# Patient Record
Sex: MALE | Race: WHITE | Employment: FULL TIME | URBAN - METROPOLITAN AREA
[De-identification: names, ages, dates, MRNs, and addresses within clinical notes are randomized per-mention and may not be internally consistent; named-entity substitution may affect disease eponyms.]

---

## 2017-05-23 ENCOUNTER — APPOINTMENT (OUTPATIENT)
Dept: PHYSICAL THERAPY | Age: 42
End: 2017-05-23
Payer: COMMERCIAL

## 2017-05-26 ENCOUNTER — HOSPITAL ENCOUNTER (OUTPATIENT)
Dept: PHYSICAL THERAPY | Age: 42
Discharge: HOME | End: 2017-05-26
Payer: COMMERCIAL

## 2017-05-26 PROCEDURE — 97161 PT EVAL LOW COMPLEX 20 MIN: CPT

## 2017-05-26 PROCEDURE — 97140 MANUAL THERAPY 1/> REGIONS: CPT
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
## 2017-05-26 NOTE — PROGRESS NOTES
Ambulatory/Rehab Services H2 Model Falls Risk Assessment    Risk Factor Pts. ·   Confusion/Disorientation/Impulsivity  []    4 ·   Symptomatic Depression  []   2 ·   Altered Elimination  []   1 ·   Dizziness/Vertigo  []   1 ·   Gender (Male)  [x]   1 ·   Any administered antiepileptics (anticonvulsants):  []   2 ·   Any administered benzodiazepines:  []   1 ·   Visual Impairment (specify):  []   1 ·   Portable Oxygen Use  []   1 ·   Orthostatic ? BP  []   1 ·   History of Recent Falls (within 3 mos.)  []   5     Ability to Rise from Chair (choose one) Pts. ·   Ability to rise in a single movement  [x]   0 ·   Pushes up, successful in one attempt  []   1 ·   Multiple attempts, but successful  []   3 ·   Unable to rise without assistance  []   4   Total: (5 or greater = High Risk) 1     Falls Prevention Plan:   []                Physical Limitations to Exercise (specify):   []                Mobility Assistance Device (type):   []                Exercise/Equipment Adaptation (specify):    ©2010 [facility] [place] Patent #[id number].  Federal Law prohibits the replication, distribution or use without written permission from [facility] [employer]

## 2017-05-30 ENCOUNTER — HOSPITAL ENCOUNTER (OUTPATIENT)
Dept: PHYSICAL THERAPY | Age: 42
Discharge: HOME | End: 2017-05-30
Payer: COMMERCIAL

## 2017-05-30 PROCEDURE — 97110 THERAPEUTIC EXERCISES: CPT

## 2017-05-30 PROCEDURE — 97140 MANUAL THERAPY 1/> REGIONS: CPT

## 2017-05-30 PROCEDURE — 97035 APP MDLTY 1+ULTRASOUND EA 15: CPT
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
## 2017-06-06 ENCOUNTER — HOSPITAL ENCOUNTER (OUTPATIENT)
Dept: PHYSICAL THERAPY | Age: 42
Discharge: HOME | End: 2017-06-06
Payer: COMMERCIAL

## 2017-06-06 PROCEDURE — 97140 MANUAL THERAPY 1/> REGIONS: CPT

## 2017-06-06 PROCEDURE — 97110 THERAPEUTIC EXERCISES: CPT

## 2017-06-06 PROCEDURE — 97012 MECHANICAL TRACTION THERAPY: CPT
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
## 2017-06-08 ENCOUNTER — HOSPITAL ENCOUNTER (OUTPATIENT)
Dept: PHYSICAL THERAPY | Age: 42
Discharge: HOME | End: 2017-06-08
Payer: COMMERCIAL

## 2017-06-08 PROCEDURE — 97110 THERAPEUTIC EXERCISES: CPT

## 2017-06-08 PROCEDURE — 97012 MECHANICAL TRACTION THERAPY: CPT
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
## 2017-06-13 ENCOUNTER — HOSPITAL ENCOUNTER (OUTPATIENT)
Dept: PHYSICAL THERAPY | Age: 42
Discharge: HOME | End: 2017-06-13
Payer: COMMERCIAL

## 2017-06-13 PROCEDURE — 97012 MECHANICAL TRACTION THERAPY: CPT

## 2017-06-13 PROCEDURE — 97110 THERAPEUTIC EXERCISES: CPT
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
## 2017-06-15 ENCOUNTER — HOSPITAL ENCOUNTER (OUTPATIENT)
Dept: PHYSICAL THERAPY | Age: 42
Discharge: HOME | End: 2017-06-15
Payer: COMMERCIAL

## 2017-06-15 PROCEDURE — 97110 THERAPEUTIC EXERCISES: CPT

## 2017-06-15 PROCEDURE — 97140 MANUAL THERAPY 1/> REGIONS: CPT
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
## 2017-06-20 ENCOUNTER — HOSPITAL ENCOUNTER (OUTPATIENT)
Dept: PHYSICAL THERAPY | Age: 42
Discharge: HOME | End: 2017-06-20
Payer: COMMERCIAL

## 2017-06-20 PROCEDURE — 97110 THERAPEUTIC EXERCISES: CPT

## 2017-06-20 PROCEDURE — 97012 MECHANICAL TRACTION THERAPY: CPT
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
## 2017-06-22 ENCOUNTER — HOSPITAL ENCOUNTER (OUTPATIENT)
Dept: PHYSICAL THERAPY | Age: 42
Discharge: HOME | End: 2017-06-22
Payer: COMMERCIAL

## 2017-06-22 PROCEDURE — 97110 THERAPEUTIC EXERCISES: CPT

## 2017-06-22 PROCEDURE — 97012 MECHANICAL TRACTION THERAPY: CPT
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
## 2017-06-27 ENCOUNTER — HOSPITAL ENCOUNTER (OUTPATIENT)
Dept: PHYSICAL THERAPY | Age: 42
Discharge: HOME | End: 2017-06-27
Payer: COMMERCIAL

## 2017-06-27 PROCEDURE — 97110 THERAPEUTIC EXERCISES: CPT

## 2017-06-27 PROCEDURE — 97012 MECHANICAL TRACTION THERAPY: CPT
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
## 2017-06-29 ENCOUNTER — HOSPITAL ENCOUNTER (OUTPATIENT)
Dept: PHYSICAL THERAPY | Age: 42
Discharge: HOME | End: 2017-06-29
Payer: COMMERCIAL

## 2017-06-29 PROCEDURE — 97012 MECHANICAL TRACTION THERAPY: CPT

## 2017-06-29 PROCEDURE — 97110 THERAPEUTIC EXERCISES: CPT
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
## 2017-07-11 ENCOUNTER — HOSPITAL ENCOUNTER (OUTPATIENT)
Dept: PHYSICAL THERAPY | Age: 42
Discharge: HOME | End: 2017-07-11
Payer: COMMERCIAL

## 2017-07-11 PROCEDURE — 97012 MECHANICAL TRACTION THERAPY: CPT

## 2017-07-11 PROCEDURE — 97110 THERAPEUTIC EXERCISES: CPT
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
## 2017-07-13 ENCOUNTER — APPOINTMENT (OUTPATIENT)
Dept: PHYSICAL THERAPY | Age: 42
End: 2017-07-13
Payer: COMMERCIAL
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
## 2022-03-03 ENCOUNTER — HOSPITAL ENCOUNTER (OUTPATIENT)
Age: 47
Discharge: NURSING, REHAB OR LONG-TERM CARE | End: 2022-03-18
Attending: INTERNAL MEDICINE | Admitting: INTERNAL MEDICINE

## 2022-03-03 ENCOUNTER — APPOINTMENT (OUTPATIENT)
Dept: GENERAL RADIOLOGY | Age: 47
End: 2022-03-03
Attending: INTERNAL MEDICINE

## 2022-03-03 PROCEDURE — 74018 RADEX ABDOMEN 1 VIEW: CPT

## 2022-03-03 PROCEDURE — 71045 X-RAY EXAM CHEST 1 VIEW: CPT

## 2022-03-04 LAB
ALBUMIN SERPL-MCNC: 2.5 G/DL (ref 3.5–5)
ALBUMIN/GLOB SERPL: 0.6 {RATIO} (ref 1.2–3.5)
ALP SERPL-CCNC: 103 U/L (ref 50–136)
ALT SERPL-CCNC: 18 U/L (ref 12–65)
ANION GAP SERPL CALC-SCNC: 12 MMOL/L (ref 7–16)
AST SERPL-CCNC: 22 U/L (ref 15–37)
BASOPHILS # BLD: 0 K/UL (ref 0–0.2)
BASOPHILS NFR BLD: 0 % (ref 0–2)
BILIRUB SERPL-MCNC: 0.5 MG/DL (ref 0.2–1.1)
BUN SERPL-MCNC: 22 MG/DL (ref 6–23)
CALCIUM SERPL-MCNC: 9.1 MG/DL (ref 8.3–10.4)
CHLORIDE SERPL-SCNC: 99 MMOL/L (ref 98–107)
CO2 SERPL-SCNC: 25 MMOL/L (ref 21–32)
CREAT SERPL-MCNC: 1 MG/DL (ref 0.8–1.5)
DIFFERENTIAL METHOD BLD: ABNORMAL
EOSINOPHIL # BLD: 0.8 K/UL (ref 0–0.8)
EOSINOPHIL NFR BLD: 7 % (ref 0.5–7.8)
ERYTHROCYTE [DISTWIDTH] IN BLOOD BY AUTOMATED COUNT: 15.3 % (ref 11.9–14.6)
GLOBULIN SER CALC-MCNC: 3.9 G/DL (ref 2.3–3.5)
GLUCOSE SERPL-MCNC: 67 MG/DL (ref 65–100)
HCT VFR BLD AUTO: 24.7 % (ref 41.1–50.3)
HGB BLD-MCNC: 7.7 G/DL (ref 13.6–17.2)
IMM GRANULOCYTES # BLD AUTO: 0.1 K/UL (ref 0–0.5)
IMM GRANULOCYTES NFR BLD AUTO: 1 % (ref 0–5)
LYMPHOCYTES # BLD: 1.3 K/UL (ref 0.5–4.6)
LYMPHOCYTES NFR BLD: 11 % (ref 13–44)
MAGNESIUM SERPL-MCNC: 2.1 MG/DL (ref 1.8–2.4)
MCH RBC QN AUTO: 28.6 PG (ref 26.1–32.9)
MCHC RBC AUTO-ENTMCNC: 31.2 G/DL (ref 31.4–35)
MCV RBC AUTO: 91.8 FL (ref 79.6–97.8)
MONOCYTES # BLD: 0.8 K/UL (ref 0.1–1.3)
MONOCYTES NFR BLD: 7 % (ref 4–12)
NEUTS SEG # BLD: 8.9 K/UL (ref 1.7–8.2)
NEUTS SEG NFR BLD: 75 % (ref 43–78)
NRBC # BLD: 0 K/UL (ref 0–0.2)
PHOSPHATE SERPL-MCNC: 3.9 MG/DL (ref 2.5–4.5)
PLATELET # BLD AUTO: 271 K/UL (ref 150–450)
PMV BLD AUTO: 10.3 FL (ref 9.4–12.3)
POTASSIUM SERPL-SCNC: 4.1 MMOL/L (ref 3.5–5.1)
PROT SERPL-MCNC: 6.4 G/DL (ref 6.3–8.2)
RBC # BLD AUTO: 2.69 M/UL (ref 4.23–5.6)
SODIUM SERPL-SCNC: 136 MMOL/L (ref 136–145)
T4 FREE SERPL-MCNC: 0.9 NG/DL (ref 0.9–1.8)
TSH SERPL DL<=0.005 MIU/L-ACNC: 1.48 UIU/ML (ref 0.36–3.74)
WBC # BLD AUTO: 11.9 K/UL (ref 4.3–11.1)

## 2022-03-04 PROCEDURE — 36415 COLL VENOUS BLD VENIPUNCTURE: CPT

## 2022-03-04 PROCEDURE — 85025 COMPLETE CBC W/AUTO DIFF WBC: CPT

## 2022-03-04 PROCEDURE — 80053 COMPREHEN METABOLIC PANEL: CPT

## 2022-03-04 PROCEDURE — 84443 ASSAY THYROID STIM HORMONE: CPT

## 2022-03-04 PROCEDURE — 84439 ASSAY OF FREE THYROXINE: CPT

## 2022-03-04 PROCEDURE — 84100 ASSAY OF PHOSPHORUS: CPT

## 2022-03-04 PROCEDURE — 83735 ASSAY OF MAGNESIUM: CPT

## 2022-03-07 LAB
ALBUMIN SERPL-MCNC: 2.5 G/DL (ref 3.5–5)
ALBUMIN/GLOB SERPL: 0.5 {RATIO} (ref 1.2–3.5)
ALP SERPL-CCNC: 78 U/L (ref 50–136)
ALT SERPL-CCNC: 28 U/L (ref 12–65)
ANION GAP SERPL CALC-SCNC: 3 MMOL/L (ref 7–16)
AST SERPL-CCNC: 16 U/L (ref 15–37)
BASOPHILS # BLD: 0.1 K/UL (ref 0–0.2)
BASOPHILS NFR BLD: 1 % (ref 0–2)
BILIRUB SERPL-MCNC: 0.3 MG/DL (ref 0.2–1.1)
BUN SERPL-MCNC: 23 MG/DL (ref 6–23)
CALCIUM SERPL-MCNC: 9.7 MG/DL (ref 8.3–10.4)
CHLORIDE SERPL-SCNC: 98 MMOL/L (ref 98–107)
CO2 SERPL-SCNC: 36 MMOL/L (ref 21–32)
CREAT SERPL-MCNC: 0.8 MG/DL (ref 0.8–1.5)
DIFFERENTIAL METHOD BLD: ABNORMAL
EOSINOPHIL # BLD: 0.9 K/UL (ref 0–0.8)
EOSINOPHIL NFR BLD: 9 % (ref 0.5–7.8)
ERYTHROCYTE [DISTWIDTH] IN BLOOD BY AUTOMATED COUNT: 14.6 % (ref 11.9–14.6)
GLOBULIN SER CALC-MCNC: 5.3 G/DL (ref 2.3–3.5)
GLUCOSE SERPL-MCNC: 114 MG/DL (ref 65–100)
HCT VFR BLD AUTO: 27.6 % (ref 41.1–50.3)
HGB BLD-MCNC: 8.5 G/DL (ref 13.6–17.2)
IMM GRANULOCYTES # BLD AUTO: 0.1 K/UL (ref 0–0.5)
IMM GRANULOCYTES NFR BLD AUTO: 1 % (ref 0–5)
LYMPHOCYTES # BLD: 1.1 K/UL (ref 0.5–4.6)
LYMPHOCYTES NFR BLD: 11 % (ref 13–44)
MCH RBC QN AUTO: 28.1 PG (ref 26.1–32.9)
MCHC RBC AUTO-ENTMCNC: 30.8 G/DL (ref 31.4–35)
MCV RBC AUTO: 91.4 FL (ref 79.6–97.8)
MONOCYTES # BLD: 0.7 K/UL (ref 0.1–1.3)
MONOCYTES NFR BLD: 7 % (ref 4–12)
NEUTS SEG # BLD: 7.2 K/UL (ref 1.7–8.2)
NEUTS SEG NFR BLD: 72 % (ref 43–78)
NRBC # BLD: 0 K/UL (ref 0–0.2)
PLATELET # BLD AUTO: 273 K/UL (ref 150–450)
PMV BLD AUTO: 10.8 FL (ref 9.4–12.3)
POTASSIUM SERPL-SCNC: 3.6 MMOL/L (ref 3.5–5.1)
PROT SERPL-MCNC: 7.8 G/DL (ref 6.3–8.2)
RBC # BLD AUTO: 3.02 M/UL (ref 4.23–5.6)
SODIUM SERPL-SCNC: 137 MMOL/L (ref 138–145)
WBC # BLD AUTO: 9.9 K/UL (ref 4.3–11.1)

## 2022-03-07 PROCEDURE — 36415 COLL VENOUS BLD VENIPUNCTURE: CPT

## 2022-03-07 PROCEDURE — 80053 COMPREHEN METABOLIC PANEL: CPT

## 2022-03-07 PROCEDURE — 85025 COMPLETE CBC W/AUTO DIFF WBC: CPT

## 2022-03-09 ENCOUNTER — APPOINTMENT (OUTPATIENT)
Dept: ULTRASOUND IMAGING | Age: 47
End: 2022-03-09
Attending: INTERNAL MEDICINE

## 2022-03-09 PROCEDURE — 93970 EXTREMITY STUDY: CPT

## 2022-03-10 ENCOUNTER — APPOINTMENT (OUTPATIENT)
Dept: GENERAL RADIOLOGY | Age: 47
End: 2022-03-10
Attending: INTERNAL MEDICINE

## 2022-03-10 PROCEDURE — 71045 X-RAY EXAM CHEST 1 VIEW: CPT

## 2022-03-12 ENCOUNTER — APPOINTMENT (OUTPATIENT)
Dept: GENERAL RADIOLOGY | Age: 47
End: 2022-03-12
Attending: INTERNAL MEDICINE

## 2022-03-12 PROCEDURE — 70360 X-RAY EXAM OF NECK: CPT

## 2022-03-14 LAB
ALBUMIN SERPL-MCNC: 2.9 G/DL (ref 3.5–5)
ALBUMIN/GLOB SERPL: 0.6 {RATIO} (ref 1.2–3.5)
ALP SERPL-CCNC: 70 U/L (ref 50–136)
ALT SERPL-CCNC: 26 U/L (ref 12–65)
ANION GAP SERPL CALC-SCNC: 1 MMOL/L (ref 7–16)
AST SERPL-CCNC: 12 U/L (ref 15–37)
BASOPHILS # BLD: 0.1 K/UL (ref 0–0.2)
BASOPHILS NFR BLD: 1 % (ref 0–2)
BILIRUB SERPL-MCNC: 0.3 MG/DL (ref 0.2–1.1)
BUN SERPL-MCNC: 23 MG/DL (ref 6–23)
CALCIUM SERPL-MCNC: 10.3 MG/DL (ref 8.3–10.4)
CHLORIDE SERPL-SCNC: 94 MMOL/L (ref 98–107)
CO2 SERPL-SCNC: 39 MMOL/L (ref 21–32)
CREAT SERPL-MCNC: 1.2 MG/DL (ref 0.8–1.5)
DIFFERENTIAL METHOD BLD: ABNORMAL
EOSINOPHIL # BLD: 0.7 K/UL (ref 0–0.8)
EOSINOPHIL NFR BLD: 10 % (ref 0.5–7.8)
ERYTHROCYTE [DISTWIDTH] IN BLOOD BY AUTOMATED COUNT: 14.6 % (ref 11.9–14.6)
GLOBULIN SER CALC-MCNC: 5 G/DL (ref 2.3–3.5)
GLUCOSE SERPL-MCNC: 91 MG/DL (ref 65–100)
HCT VFR BLD AUTO: 29.6 % (ref 41.1–50.3)
HGB BLD-MCNC: 9 G/DL (ref 13.6–17.2)
IMM GRANULOCYTES # BLD AUTO: 0.1 K/UL (ref 0–0.5)
IMM GRANULOCYTES NFR BLD AUTO: 1 % (ref 0–5)
LYMPHOCYTES # BLD: 1.4 K/UL (ref 0.5–4.6)
LYMPHOCYTES NFR BLD: 21 % (ref 13–44)
MCH RBC QN AUTO: 27.5 PG (ref 26.1–32.9)
MCHC RBC AUTO-ENTMCNC: 30.4 G/DL (ref 31.4–35)
MCV RBC AUTO: 90.5 FL (ref 79.6–97.8)
MONOCYTES # BLD: 0.5 K/UL (ref 0.1–1.3)
MONOCYTES NFR BLD: 7 % (ref 4–12)
NEUTS SEG # BLD: 4.1 K/UL (ref 1.7–8.2)
NEUTS SEG NFR BLD: 60 % (ref 43–78)
NRBC # BLD: 0 K/UL (ref 0–0.2)
PLATELET # BLD AUTO: 277 K/UL (ref 150–450)
PMV BLD AUTO: 9.7 FL (ref 9.4–12.3)
POTASSIUM SERPL-SCNC: 4.4 MMOL/L (ref 3.5–5.1)
PROT SERPL-MCNC: 7.9 G/DL (ref 6.3–8.2)
RBC # BLD AUTO: 3.27 M/UL (ref 4.23–5.6)
SODIUM SERPL-SCNC: 134 MMOL/L (ref 138–145)
WBC # BLD AUTO: 6.9 K/UL (ref 4.3–11.1)

## 2022-03-14 PROCEDURE — 80053 COMPREHEN METABOLIC PANEL: CPT

## 2022-03-14 PROCEDURE — 36415 COLL VENOUS BLD VENIPUNCTURE: CPT

## 2022-03-14 PROCEDURE — 85025 COMPLETE CBC W/AUTO DIFF WBC: CPT

## 2022-08-05 ENCOUNTER — OFFICE VISIT (OUTPATIENT)
Dept: PULMONOLOGY | Age: 47
End: 2022-08-05
Payer: COMMERCIAL

## 2022-08-05 ENCOUNTER — HOSPITAL ENCOUNTER (OUTPATIENT)
Dept: GENERAL RADIOLOGY | Age: 47
Discharge: HOME | End: 2022-08-08
Payer: COMMERCIAL

## 2022-08-05 VITALS
OXYGEN SATURATION: 100 % | DIASTOLIC BLOOD PRESSURE: 80 MMHG | RESPIRATION RATE: 20 BRPM | HEIGHT: 73 IN | TEMPERATURE: 98 F | HEART RATE: 103 BPM | BODY MASS INDEX: 23.59 KG/M2 | WEIGHT: 178 LBS | SYSTOLIC BLOOD PRESSURE: 110 MMHG

## 2022-08-05 DIAGNOSIS — J84.10 PULMONARY FIBROSIS (HCC): ICD-10-CM

## 2022-08-05 DIAGNOSIS — J96.11 CHRONIC RESPIRATORY FAILURE WITH HYPOXIA (HCC): Primary | ICD-10-CM

## 2022-08-05 DIAGNOSIS — U09.9 POST-COVID-19 CONDITION: ICD-10-CM

## 2022-08-05 PROCEDURE — 71046 X-RAY EXAM CHEST 2 VIEWS: CPT

## 2022-08-05 PROCEDURE — 99204 OFFICE O/P NEW MOD 45 MIN: CPT | Performed by: INTERNAL MEDICINE

## 2022-08-05 RX ORDER — TRAZODONE HYDROCHLORIDE 100 MG/1
100 TABLET ORAL NIGHTLY PRN
COMMUNITY
Start: 2022-07-25

## 2022-08-05 RX ORDER — CLONAZEPAM 1 MG/1
1 TABLET ORAL 3 TIMES DAILY
COMMUNITY
Start: 2022-07-25 | End: 2022-10-19

## 2022-08-05 RX ORDER — GABAPENTIN 100 MG/1
CAPSULE ORAL
COMMUNITY
Start: 2022-06-07

## 2022-08-05 NOTE — PROGRESS NOTES
getting some better. He is doing in home PT. Has a referral for pulmonary rehab already. He states that his O2 needs are often 1-1.5L at rest and 4L with exertion. In the evening he can go without it for hours at rest. Lowest with this is typically 91-92%. He is using a cane when he walks outside of the home. No history of past pulmonary problems. REVIEW OF SYSTEMS:  10 point review of systems is negative except as reported in HPI. PHYSICAL EXAM:  There were no vitals filed for this visit. PERTINENT FINDINGS:   NAD  CTA B, no w/r/r  RRR, no m/r/g  No LE Edema. DIAGNOSTIC TESTS:                                                                                                             LABS: No results for input(s): HGB, HCT, TSH, NTPROBNP in the last 72 hours. Imaging:  CXR: No results found for this or any previous visit from the past 365 days. CT WITHOUT CONTRAST: No results found for this or any previous visit from the past 365 days. CT WITH CONTRAST: No results found for this or any previous visit from the past 365 days. CT HIGH RES: No results found for this or any previous visit from the past 365 days. CT PE PROTOCOL: No results found for this or any previous visit from the past 365 days. LDCT SCREENING: No results found for this or any previous visit from the past 365 days. PET SCAN: No results found for this or any previous visit from the past 365 days. PFTs:   No flowsheet data found. Exercise Oximetry: No results found for this or any previous visit. Echo: No results found for this or any previous visit. [facility] Reference Info:                                                                                                                  No past medical history on file.    Tobacco Use: High Risk    Smoking Tobacco Use: Some Days    Smokeless Tobacco Use: Unknown     Not on File  Current Outpatient Medications   Medication Instructions HYDROcodone-acetaminophen (NORCO) 5-325 MG per tablet 1 tablet, Oral, EVERY 6 HOURS PRN

## 2022-08-11 ENCOUNTER — TELEPHONE (OUTPATIENT)
Dept: PULMONOLOGY | Age: 47
End: 2022-08-11

## 2022-08-11 DIAGNOSIS — J96.11 CHRONIC RESPIRATORY FAILURE WITH HYPOXIA (HCC): Primary | ICD-10-CM

## 2022-08-11 DIAGNOSIS — J84.10 PULMONARY FIBROSIS (HCC): ICD-10-CM

## 2022-08-11 DIAGNOSIS — U09.9 POST-COVID-19 CONDITION: ICD-10-CM

## 2022-08-12 NOTE — TELEPHONE ENCOUNTER
Patient is being d/c today from [address] [place] [address] Montrell Radford PT- she states he needs us to refer him to Outpt Pulmonary Rehab beginning next week.
Referral entered and faxed.
no indicators present

## 2022-08-19 ENCOUNTER — TELEPHONE (OUTPATIENT)
Dept: PULMONOLOGY | Age: 47
End: 2022-08-19

## 2022-08-19 DIAGNOSIS — J96.11 CHRONIC RESPIRATORY FAILURE WITH HYPOXIA (HCC): ICD-10-CM

## 2022-08-19 DIAGNOSIS — J84.10 PULMONARY FIBROSIS (HCC): ICD-10-CM

## 2022-08-19 DIAGNOSIS — U09.9 POST-COVID-19 CONDITION: Primary | ICD-10-CM

## 2022-08-19 NOTE — TELEPHONE ENCOUNTER
----- Message from Maggie Uribe MD sent at 8/14/2022 12:59 PM EDT -----  Can you let the pt know that his CXR showed some ongoing covid related changes. I would like to get a non contrast CT chest to see if there are any inflammatory changes that additional immunomodulator medications may improve. Thanks.    Maggie Uribe MD

## 2022-08-19 NOTE — TELEPHONE ENCOUNTER
Spoke with the patient in regards to their CXR results, explained per Dr. Gleason  that the CXR showed some ongoing Covid related changes and that he would like to get a CT scan to see if there are any inflammatory changes that additional immunomodulator mediations may improve. I also explained that his ART showed that 1 liter of oxygen qhs was enough for him and that he can continue using this setting. Patient understood the results and did not have any further questions or concerns at this time. Order for the CT scan has been established.  // Lisa JOHN

## 2022-08-26 ENCOUNTER — HOSPITAL ENCOUNTER (OUTPATIENT)
Dept: CT IMAGING | Age: 47
Discharge: HOME | End: 2022-08-29
Payer: COMMERCIAL

## 2022-08-26 DIAGNOSIS — J96.11 CHRONIC RESPIRATORY FAILURE WITH HYPOXIA (HCC): ICD-10-CM

## 2022-08-26 DIAGNOSIS — U09.9 POST-COVID-19 CONDITION: ICD-10-CM

## 2022-08-26 DIAGNOSIS — J84.10 PULMONARY FIBROSIS (HCC): ICD-10-CM

## 2022-08-26 PROCEDURE — 71250 CT THORAX DX C-: CPT

## 2022-08-29 ENCOUNTER — TELEPHONE (OUTPATIENT)
Dept: PULMONOLOGY | Age: 47
End: 2022-08-29

## 2022-08-29 DIAGNOSIS — U09.9 POST-COVID-19 CONDITION: ICD-10-CM

## 2022-08-29 DIAGNOSIS — J84.10 PULMONARY FIBROSIS (HCC): Primary | ICD-10-CM

## 2022-08-29 RX ORDER — PREDNISONE 20 MG/1
60 TABLET ORAL DAILY
Qty: 90 TABLET | Refills: 2 | Status: SHIPPED | OUTPATIENT
Start: 2022-08-29 | End: 2022-09-30 | Stop reason: SDUPTHER

## 2022-08-29 NOTE — TELEPHONE ENCOUNTER
----- Message from Josue Horta MD sent at 8/29/2022 11:56 AM EDT -----  Can you let the patient know that his CT scan showed what looks like ongoing lung inflammation. I think it would be worth while to start him on steroids to see if we get any improvement in his lung function. I will order him a rx for prednisone 60mg daily and would like for him to continue it for 1 month and then repeat a CT chest without contrast if we could set that up. We will then determine how quickly to wean the steroids. Thanks.    Josue Horta MD

## 2022-08-29 NOTE — TELEPHONE ENCOUNTER
Spoke with the patient in regards to their CT scan results, explained per Dr. Katherine Coronel that the CT scan showed what looks like ongoing lung inflammation and that Dr. Katherine Coronel feels like it would be worth while to start him on steroids to see if we get any improvement in his lung function. I also notified the patient that Dr. Katherine Coronel has already sent a prescription for Prednisone 60 mg daily to his pharmacy and that we will repeat the CT scan in 1 month to determine how quickly we are to wean the steroids. Patient understood the results and did not have any further questions or concerns at this time. Order for CT scan has been established.  // Jeancarlos Fret. A.

## 2022-08-31 ENCOUNTER — HOSPITAL ENCOUNTER (OUTPATIENT)
Dept: CARDIAC REHAB | Age: 47
Setting detail: RECURRING SERIES
Discharge: HOME | End: 2022-09-03

## 2022-08-31 ASSESSMENT — PATIENT HEALTH QUESTIONNAIRE - PHQ9
5. POOR APPETITE OR OVEREATING: 0
SUM OF ALL RESPONSES TO PHQ9 QUESTIONS 1 & 2: 0
6. FEELING BAD ABOUT YOURSELF - OR THAT YOU ARE A FAILURE OR HAVE LET YOURSELF OR YOUR FAMILY DOWN: 0
SUM OF ALL RESPONSES TO PHQ QUESTIONS 1-9: 0
2. FEELING DOWN, DEPRESSED OR HOPELESS: 0
3. TROUBLE FALLING OR STAYING ASLEEP: 0
1. LITTLE INTEREST OR PLEASURE IN DOING THINGS: 0
10. IF YOU CHECKED OFF ANY PROBLEMS, HOW DIFFICULT HAVE THESE PROBLEMS MADE IT FOR YOU TO DO YOUR WORK, TAKE CARE OF THINGS AT HOME, OR GET ALONG WITH OTHER PEOPLE: 0
8. MOVING OR SPEAKING SO SLOWLY THAT OTHER PEOPLE COULD HAVE NOTICED. OR THE OPPOSITE, BEING SO FIGETY OR RESTLESS THAT YOU HAVE BEEN MOVING AROUND A LOT MORE THAN USUAL: 0
9. THOUGHTS THAT YOU WOULD BE BETTER OFF DEAD, OR OF HURTING YOURSELF: 0
SUM OF ALL RESPONSES TO PHQ QUESTIONS 1-9: 0
4. FEELING TIRED OR HAVING LITTLE ENERGY: 0
SUM OF ALL RESPONSES TO PHQ QUESTIONS 1-9: 0
SUM OF ALL RESPONSES TO PHQ QUESTIONS 1-9: 0
7. TROUBLE CONCENTRATING ON THINGS, SUCH AS READING THE NEWSPAPER OR WATCHING TELEVISION: 0

## 2022-08-31 ASSESSMENT — LIFESTYLE VARIABLES: SMOKELESS_TOBACCO: NO

## 2022-08-31 NOTE — CARDIO/PULMONARY
8/31/2022      Dear Dr. Jose Nieves: Thank you for referring your patient, Thomas Chowdhury (EFY:[id number] ), to the Pulmonary Rehabilitation Program at [address] [facility]. Mr. Destini Hull is a good candidate for the program and should see improvements with regular participation. We will be working to increase your patient's endurance and self care over the next 12 weeks. We will contact you with any issues or concerns that may arise, or you can follow your patient's progress through [address] [place] [address] at any time. We will send you a final summary report when the program is completed. Again, thank you for your referral. If we can be of further assistance, please feel free to contact the Cardiopulmonary Rehab staff at [phone].     Sincerely,      Madeleine Frankel, RRT,   Pulmonary Rehab Specialist   [facility]    CC: File

## 2022-09-06 ENCOUNTER — HOSPITAL ENCOUNTER (OUTPATIENT)
Dept: CARDIAC REHAB | Age: 47
Setting detail: RECURRING SERIES
Discharge: HOME | End: 2022-09-09
Payer: COMMERCIAL

## 2022-09-06 PROCEDURE — G0239 OTH RESP PROC, GROUP: HCPCS

## 2022-09-06 ASSESSMENT — EXERCISE STRESS TEST
PEAK_METS: 2.1
PEAK_BP: 146/70
PEAK_HR: 108
PEAK_BP: 146/70
PEAK_BP: 146/70
PEAK_HR: 108

## 2022-09-06 ASSESSMENT — LIFESTYLE VARIABLES: SMOKELESS_TOBACCO: NO

## 2022-09-07 VITALS — WEIGHT: 174.2 LBS | OXYGEN SATURATION: 96 % | BODY MASS INDEX: 23.09 KG/M2 | HEIGHT: 73 IN

## 2022-09-08 ENCOUNTER — HOSPITAL ENCOUNTER (OUTPATIENT)
Dept: CARDIAC REHAB | Age: 47
Setting detail: RECURRING SERIES
Discharge: HOME | End: 2022-09-11
Payer: COMMERCIAL

## 2022-09-08 PROCEDURE — 94625 PHY/QHP OP PULM RHB W/O MNTR: CPT

## 2022-09-08 ASSESSMENT — EXERCISE STRESS TEST
PEAK_HR: 98
PEAK_BP: 112/76
PEAK_METS: 2.2

## 2022-09-13 ENCOUNTER — HOSPITAL ENCOUNTER (OUTPATIENT)
Dept: CARDIAC REHAB | Age: 47
Setting detail: RECURRING SERIES
Discharge: HOME | End: 2022-09-16
Payer: COMMERCIAL

## 2022-09-13 VITALS — BODY MASS INDEX: 23.67 KG/M2 | WEIGHT: 179.4 LBS

## 2022-09-13 PROCEDURE — 94625 PHY/QHP OP PULM RHB W/O MNTR: CPT

## 2022-09-13 ASSESSMENT — EXERCISE STRESS TEST
PEAK_METS: 2.7
PEAK_BP: 118/70
PEAK_HR: 91

## 2022-09-14 ENCOUNTER — NURSE ONLY (OUTPATIENT)
Dept: PULMONOLOGY | Age: 47
End: 2022-09-14
Payer: COMMERCIAL

## 2022-09-14 DIAGNOSIS — U09.9 POST-COVID-19 CONDITION: ICD-10-CM

## 2022-09-14 DIAGNOSIS — R06.09 DOE (DYSPNEA ON EXERTION): Primary | ICD-10-CM

## 2022-09-14 LAB
FEV 1 , POC: 1.66 L
FEV1 % PRED, POC: 33 %
FEV1/FVC, POC: NORMAL
FVC % PRED, POC: 37 %
FVC, POC: NORMAL
TOTAL HEMOGLOBIN (SPHB), POC: 15.9 G/DL

## 2022-09-14 PROCEDURE — 88738 HGB QUANT TRANSCUTANEOUS: CPT | Performed by: INTERNAL MEDICINE

## 2022-09-14 PROCEDURE — 94729 DIFFUSING CAPACITY: CPT | Performed by: INTERNAL MEDICINE

## 2022-09-14 PROCEDURE — 94726 PLETHYSMOGRAPHY LUNG VOLUMES: CPT | Performed by: INTERNAL MEDICINE

## 2022-09-14 PROCEDURE — 94060 EVALUATION OF WHEEZING: CPT | Performed by: INTERNAL MEDICINE

## 2022-09-14 ASSESSMENT — PULMONARY FUNCTION TESTS
FVC_PERCENT_PREDICTED_POC: 37
FEV1_PERCENT_PREDICTED_POC: 33

## 2022-09-15 ENCOUNTER — HOSPITAL ENCOUNTER (OUTPATIENT)
Dept: CARDIAC REHAB | Age: 47
Setting detail: RECURRING SERIES
End: 2022-09-15
Payer: COMMERCIAL

## 2022-09-19 ENCOUNTER — HOSPITAL ENCOUNTER (OUTPATIENT)
Dept: CARDIAC REHAB | Age: 47
Setting detail: RECURRING SERIES
Discharge: HOME | End: 2022-09-22
Payer: COMMERCIAL

## 2022-09-19 VITALS — BODY MASS INDEX: 24.12 KG/M2 | WEIGHT: 182.8 LBS

## 2022-09-19 PROCEDURE — 94625 PHY/QHP OP PULM RHB W/O MNTR: CPT

## 2022-09-19 ASSESSMENT — EXERCISE STRESS TEST
PEAK_HR: 98
PEAK_METS: 2.7
PEAK_BP: 110/76

## 2022-09-20 ENCOUNTER — APPOINTMENT (OUTPATIENT)
Dept: CARDIAC REHAB | Age: 47
End: 2022-09-20
Payer: COMMERCIAL

## 2022-09-20 ENCOUNTER — CLINICAL DOCUMENTATION (OUTPATIENT)
Dept: PULMONOLOGY | Age: 47
End: 2022-09-20

## 2022-09-20 ENCOUNTER — NURSE ONLY (OUTPATIENT)
Dept: PULMONOLOGY | Age: 47
End: 2022-09-20
Payer: COMMERCIAL

## 2022-09-20 DIAGNOSIS — R06.09 DOE (DYSPNEA ON EXERTION): Primary | ICD-10-CM

## 2022-09-20 PROCEDURE — 94618 PULMONARY STRESS TESTING: CPT | Performed by: INTERNAL MEDICINE

## 2022-09-20 NOTE — PROGRESS NOTES
[address] , [facility], [address]  [phone]    6 Minute Walk Test      Patient's Name Diego Beach   Age 50 y.o. Gender male   Height 73 inches   Weight 182 lbs   Ordering Provider  Dr. Camilo Villavicencio     Medications taken before test are up to date:  Yes   Supplemental oxygen during the test:  Yes, @ 5 LPM Pulse     Base End    Time 1510 1516 Pm   Blood Pressure 128/79 132/74 mmHg   Heart Rate  75 90 bpm   Dyspnea  1 4 Maurice Scale   Fatigue 0 3 Maurice Scale   O2 Saturation  95% 84% O2       Stopped or paused before 6 minutes? Yes   Other symptoms at end of exercise: None     Number of laps: ___5__ x 60 meters = ___300___ meters + final partial lap:__45____ meters =  ___345____ meters    Total distance walked in 6 minutes: 345  Meters  Predicted distance: _____703______ meters  Percent of predicted distance: __49__ %                Tech comments: Patient started the walk with 5L of oxygen. After walking 4.5 minutes patient had to stop for 20 seconds and increased oxygen to 6L to finish test.   After test patient's SpO2 was 84% and denilson to 94% after resting for 2 minutes.     Test Performed by: Allyssa Graves RRT

## 2022-09-21 ENCOUNTER — HOSPITAL ENCOUNTER (OUTPATIENT)
Dept: CARDIAC REHAB | Age: 47
Setting detail: RECURRING SERIES
Discharge: HOME | End: 2022-09-24
Payer: COMMERCIAL

## 2022-09-21 PROCEDURE — 93798 PHYS/QHP OP CAR RHAB W/ECG: CPT

## 2022-09-21 PROCEDURE — 94625 PHY/QHP OP PULM RHB W/O MNTR: CPT

## 2022-09-21 ASSESSMENT — EXERCISE STRESS TEST
PEAK_HR: 110
PEAK_METS: 3.1
PEAK_BP: 120/72

## 2022-09-22 ENCOUNTER — APPOINTMENT (OUTPATIENT)
Dept: CARDIAC REHAB | Age: 47
End: 2022-09-22
Payer: COMMERCIAL

## 2022-09-26 ENCOUNTER — HOSPITAL ENCOUNTER (OUTPATIENT)
Dept: CARDIAC REHAB | Age: 47
Setting detail: RECURRING SERIES
End: 2022-09-26
Payer: COMMERCIAL

## 2022-09-26 ENCOUNTER — HOSPITAL ENCOUNTER (OUTPATIENT)
Dept: CARDIAC REHAB | Age: 47
Setting detail: RECURRING SERIES
Discharge: HOME | End: 2022-09-29
Payer: COMMERCIAL

## 2022-09-26 VITALS — WEIGHT: 184 LBS | BODY MASS INDEX: 24.28 KG/M2

## 2022-09-26 PROCEDURE — 94625 PHY/QHP OP PULM RHB W/O MNTR: CPT

## 2022-09-26 ASSESSMENT — EXERCISE STRESS TEST
PEAK_HR: 123
PEAK_METS: 3.1
PEAK_BP: 108/70

## 2022-09-27 ENCOUNTER — APPOINTMENT (OUTPATIENT)
Dept: CARDIAC REHAB | Age: 47
End: 2022-09-27
Payer: COMMERCIAL

## 2022-09-28 ENCOUNTER — TELEPHONE (OUTPATIENT)
Dept: PULMONOLOGY | Age: 47
End: 2022-09-28

## 2022-09-28 ENCOUNTER — HOSPITAL ENCOUNTER (OUTPATIENT)
Dept: CARDIAC REHAB | Age: 47
Setting detail: RECURRING SERIES
Discharge: HOME | End: 2022-10-01
Payer: COMMERCIAL

## 2022-09-28 PROCEDURE — 94625 PHY/QHP OP PULM RHB W/O MNTR: CPT

## 2022-09-28 ASSESSMENT — EXERCISE STRESS TEST
PEAK_BP: 108/80
PEAK_METS: 3.1
PEAK_HR: 121

## 2022-09-28 NOTE — TELEPHONE ENCOUNTER
----- Message from Milton Gonzalez MD sent at 9/27/2022 11:57 AM EDT -----  Can you let the patient know that he desatured to 84% even on 5L O2. He likely needs at least 6L. Can we see if we can use this data to get him a 10L concentrator for home use. Thanks.      Milton Gonzalez MD

## 2022-09-28 NOTE — TELEPHONE ENCOUNTER
Order has been sent via [employer] to [employer] to increase oxygen to 6 lpm cont and will need a 10 liter concentrator.   Patient will also be notified via Mychart. // Carmine Melgar

## 2022-09-29 ENCOUNTER — APPOINTMENT (OUTPATIENT)
Dept: CARDIAC REHAB | Age: 47
End: 2022-09-29
Payer: COMMERCIAL

## 2022-09-30 ENCOUNTER — HOSPITAL ENCOUNTER (OUTPATIENT)
Dept: CT IMAGING | Age: 47
Discharge: HOME | End: 2022-10-03
Payer: COMMERCIAL

## 2022-09-30 DIAGNOSIS — J84.10 PULMONARY FIBROSIS (HCC): ICD-10-CM

## 2022-09-30 DIAGNOSIS — U09.9 POST-COVID-19 CONDITION: ICD-10-CM

## 2022-09-30 PROCEDURE — 71250 CT THORAX DX C-: CPT

## 2022-09-30 NOTE — TELEPHONE ENCOUNTER
Patient Refill Request     Last Office Visit: 08/05/2022 with Dr. Aman Junior     When they were supposed to follow up: 3 months     Upcoming Office Visit: 11/16/2022 with Dr. Johnny Vicente Requested: Prednisone 20 mg     Type of refill: 90 day     Requested Pharmacy: [facility] [address]     Is prescription pended?  Yes

## 2022-10-03 ENCOUNTER — PATIENT MESSAGE (OUTPATIENT)
Dept: PULMONOLOGY | Age: 47
End: 2022-10-03

## 2022-10-03 ENCOUNTER — HOSPITAL ENCOUNTER (OUTPATIENT)
Dept: CARDIAC REHAB | Age: 47
Setting detail: RECURRING SERIES
Discharge: HOME | End: 2022-10-06
Payer: COMMERCIAL

## 2022-10-03 VITALS — WEIGHT: 185 LBS | BODY MASS INDEX: 24.41 KG/M2

## 2022-10-03 PROCEDURE — 94625 PHY/QHP OP PULM RHB W/O MNTR: CPT

## 2022-10-03 RX ORDER — PREDNISONE 20 MG/1
60 TABLET ORAL DAILY
Qty: 90 TABLET | Refills: 2 | Status: SHIPPED | OUTPATIENT
Start: 2022-10-03

## 2022-10-03 ASSESSMENT — EXERCISE STRESS TEST
PEAK_BP: 126/60
PEAK_METS: 3.1
PEAK_HR: 130

## 2022-10-03 NOTE — TELEPHONE ENCOUNTER
Spoke with Dr. Cecilia Nielsen and he stated that he would like for the patient to come in sooner so that way he can explain the disability paperwork and his condition further in detail. Patient was agreeable with an appointment on 10/21/22 @ 9:40 am.  Patient understood and did not have any further questions or concerns at this time.  // Carmenza Pedersen

## 2022-10-03 NOTE — PROGRESS NOTES
73-year-old male  s/p COVID enrolled in pulmonary rehabilitation, seen today for initial nutrition counseling. Patient stated goals: trying to gain weight/muscle    NUTRITION ASSESSMENT   Anthropometrics:  Ht: 6' 1\"  Wt: 185#  BMI: 24.41  BMI class of Normal based on age below 72  Waist measurement: 36.5      Medical History: COVID 1/22 3 month hospitalization/rehab    Nutrition related medications/supplements: prednisone     Nutrition Related Labs:   reviewed    Nutrition/Diet History:   Patient has lost significant amount of weight when hospitalized, started at 235# and was discharged at 160#. He has started to gain healthy weight back. He lost his taste for about 4 months and says he now craves 'good' healthy foods and doesn't eat much junk food at all anymore. Diet Recall:  Breakfast: 2 cups coffee, bran flakes, glasss of OJ, pop tart  Lunch: salad with yogurt   Dinner: chicken biscuit with peas and rice   Beverages: 2-3 L water daily, 2 cups coffee, 1 glass OJ   Oral Nutritional Supplements: emergen-C daily     NFPE: no muscle or fat loss observed. Lifestyle, Culture, Support System: Pt is on disability, lives alone. Physical Activity: Ambulates independently; participates in 45-60 min of supervised rehabilitation 2x per week. Supplemental O2 use    Stage of Behavior Change: Action    Barriers: none identified    Estimated Nutritional Needs:  Calories: MSJ x 1.3 (increased metabolic demand): 5061 calories   Protein: 1.2-1.5g/kg BW for protein/muscle synthesis: 100-126g    NUTRITION DIAGNOSIS  No nutrition diagnosis at this time. NUTRITION INTERVENTION  Nutrition Education and Counseling:   Reviewed optimal nutrition to improve breathing and improve immune function. Discussed importance of adequate protein to support lean mass and healthy weight gain.  May add premier protein shake   Encouraged including 5 servings of fruits/vegetables daily to increase fiber to 30g/day   Reviewed body comp results/goals, and nutrition modifications that will support improvements in body composition. Guidelines for sodium (<2300mg/day or follow MD recommendations)      Handouts provided for home use:   [employer] Eating Plate    Nutrition Goals: Increase protein intake to improve strength, stamina, and breathing by the end of pulmonary rehabilitation    NUTRITION MONITORING/EVALUATION  RD to follow up with participant during rehab sessions for questions and assessment of progression toward goals. Anticipate Great compliance with recommendations. Pt verbalizes understanding to recommendations discussed.      Virgie Hodgkin, [address] [place] [address], SHRUTI  Cardiopulmonary Rehab Dietitian

## 2022-10-03 NOTE — TELEPHONE ENCOUNTER
From: HAILE Ortiz  To: Osiris Laura  Sent: 10/3/2022 1:31 PM EDT  Subject: Gary Bill Augusto Juliette Montero is your ADA Paperwork as per discussed. If you have any further questions or concerns please do not hesitate to give our office a call.     Sincerely,  Francesca MASTERSON

## 2022-10-04 ENCOUNTER — APPOINTMENT (OUTPATIENT)
Dept: CARDIAC REHAB | Age: 47
End: 2022-10-04
Payer: COMMERCIAL

## 2022-10-05 ENCOUNTER — HOSPITAL ENCOUNTER (OUTPATIENT)
Dept: CARDIAC REHAB | Age: 47
Setting detail: RECURRING SERIES
End: 2022-10-05
Payer: COMMERCIAL

## 2022-10-06 ENCOUNTER — APPOINTMENT (OUTPATIENT)
Dept: CARDIAC REHAB | Age: 47
End: 2022-10-06
Payer: COMMERCIAL

## 2022-10-06 ASSESSMENT — EXERCISE STRESS TEST: PEAK_BP: 126/60

## 2022-10-06 ASSESSMENT — LIFESTYLE VARIABLES: SMOKELESS_TOBACCO: NO

## 2022-10-06 NOTE — RESULT ENCOUNTER NOTE
Repeat CT scan not changed with course of steroids. Patient has follow up appointment in a couple weeks and depending on symptoms we will likely lay out a relatively quick steroid taper plan.    Anna Morrell MD

## 2022-10-10 ENCOUNTER — HOSPITAL ENCOUNTER (OUTPATIENT)
Dept: CARDIAC REHAB | Age: 47
Setting detail: RECURRING SERIES
Discharge: HOME | End: 2022-10-13
Payer: COMMERCIAL

## 2022-10-10 VITALS — BODY MASS INDEX: 24.41 KG/M2 | WEIGHT: 185 LBS

## 2022-10-10 PROCEDURE — 94625 PHY/QHP OP PULM RHB W/O MNTR: CPT

## 2022-10-10 ASSESSMENT — EXERCISE STRESS TEST
PEAK_HR: 123
PEAK_METS: 2.4
PEAK_BP: 120/70

## 2022-10-11 ENCOUNTER — APPOINTMENT (OUTPATIENT)
Dept: CARDIAC REHAB | Age: 47
End: 2022-10-11
Payer: COMMERCIAL

## 2022-10-12 ENCOUNTER — HOSPITAL ENCOUNTER (OUTPATIENT)
Dept: CARDIAC REHAB | Age: 47
Setting detail: RECURRING SERIES
End: 2022-10-12
Payer: COMMERCIAL

## 2022-10-13 ENCOUNTER — APPOINTMENT (OUTPATIENT)
Dept: CARDIAC REHAB | Age: 47
End: 2022-10-13
Payer: COMMERCIAL

## 2022-10-17 ENCOUNTER — HOSPITAL ENCOUNTER (OUTPATIENT)
Dept: CARDIAC REHAB | Age: 47
Setting detail: RECURRING SERIES
Discharge: HOME | End: 2022-10-20
Payer: COMMERCIAL

## 2022-10-17 VITALS — WEIGHT: 187 LBS | BODY MASS INDEX: 24.67 KG/M2

## 2022-10-17 PROCEDURE — 94625 PHY/QHP OP PULM RHB W/O MNTR: CPT

## 2022-10-17 ASSESSMENT — EXERCISE STRESS TEST
PEAK_METS: 3
PEAK_HR: 120
PEAK_BP: 126/72

## 2022-10-18 ENCOUNTER — APPOINTMENT (OUTPATIENT)
Dept: CARDIAC REHAB | Age: 47
End: 2022-10-18
Payer: COMMERCIAL

## 2022-10-19 ENCOUNTER — HOSPITAL ENCOUNTER (OUTPATIENT)
Dept: CARDIAC REHAB | Age: 47
Setting detail: RECURRING SERIES
Discharge: HOME | End: 2022-10-22
Payer: COMMERCIAL

## 2022-10-19 ENCOUNTER — INITIAL CONSULT (OUTPATIENT)
Dept: CARDIOLOGY CLINIC | Age: 47
End: 2022-10-19
Payer: COMMERCIAL

## 2022-10-19 VITALS
HEIGHT: 73 IN | HEART RATE: 101 BPM | WEIGHT: 188 LBS | SYSTOLIC BLOOD PRESSURE: 120 MMHG | DIASTOLIC BLOOD PRESSURE: 86 MMHG | BODY MASS INDEX: 24.92 KG/M2

## 2022-10-19 DIAGNOSIS — Z76.89 ENCOUNTER TO ESTABLISH CARE: Primary | ICD-10-CM

## 2022-10-19 DIAGNOSIS — I48.0 PAROXYSMAL ATRIAL FIBRILLATION (HCC): ICD-10-CM

## 2022-10-19 DIAGNOSIS — U09.9 COVID-19 LONG HAULER: ICD-10-CM

## 2022-10-19 DIAGNOSIS — R06.02 SHORTNESS OF BREATH: ICD-10-CM

## 2022-10-19 DIAGNOSIS — J96.11 CHRONIC RESPIRATORY FAILURE WITH HYPOXIA (HCC): ICD-10-CM

## 2022-10-19 PROCEDURE — 99245 OFF/OP CONSLTJ NEW/EST HI 55: CPT | Performed by: INTERNAL MEDICINE

## 2022-10-19 PROCEDURE — 93000 ELECTROCARDIOGRAM COMPLETE: CPT | Performed by: INTERNAL MEDICINE

## 2022-10-19 PROCEDURE — 94625 PHY/QHP OP PULM RHB W/O MNTR: CPT

## 2022-10-19 RX ORDER — BUSPIRONE HYDROCHLORIDE 5 MG/1
5 TABLET ORAL 2 TIMES DAILY
COMMUNITY

## 2022-10-19 RX ORDER — ASPIRIN 81 MG/1
81 TABLET ORAL DAILY
COMMUNITY

## 2022-10-19 ASSESSMENT — EXERCISE STRESS TEST
PEAK_METS: 3
PEAK_HR: 132
PEAK_BP: 126/70

## 2022-10-19 NOTE — PROGRESS NOTES
[address], [facility], [address] [place] [address]  PHONE: [phone]        10/19/22    NAME:  Mike Roque  : 1975  MRN: [id number]       SUBJECTIVE:   Mike Roque is a 50 y.o. male seen for a consultation visit regarding the following:     Chief Complaint   Patient presents with    Consultation          HPI:  Consultation is requested by Bridger Leija DO for evaluation of Consultation  Was working for [employer], became ill in [address].  Had prolonged illness with COVID, trach, stay at [facility]. Former tobacco smoker, quit in 2022. Had renal failure needing CRRT, pAfib. Prolonged 2 mo stay. 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 HUMPHREY ongoing now, doing PT and exercise. NO CP, pressure. On oxygen now. No edema. No edema. Patient denies recent history of orthopnea, PND, excessive dizziness and/or syncope. Some anxiety issues as well. Father with PPM, PCI, seen by EP. Carly 2022 Echo:  · The left ventricular systolic function is normal (55-65%). · Unable to assess left ventricular diastolic function. · The right ventricle is dilated. · The right ventricular systolic function is normal.   · Mild tricuspid valve regurgitation. Past Medical History, Past Surgical History, Family history, Social History, and Medications were all reviewed with the patient today and updated as necessary.        Current Outpatient Medications   Medication Sig Dispense Refill    sertraline (ZOLOFT) 50 MG tablet Take 50 mg by mouth daily      aspirin 81 MG EC tablet Take 81 mg by mouth daily      busPIRone (BUSPAR) 5 MG tablet Take 5 mg by mouth 2 times daily      predniSONE (DELTASONE) 20 MG tablet Take 3 tablets by mouth daily (Patient taking differently: Take 20 mg by mouth daily) 90 tablet 2    fluticasone-salmeterol (ADVAIR HFA) 115-21 MCG/ACT inhaler Inhale 2 puffs into the lungs 2 times daily Rinse mouth after use      gabapentin (NEURONTIN) 100 MG capsule TAKE 1 CAPSULE BY MOUTH THREE TIMES A DAY      metoprolol tartrate (LOPRESSOR) 25 MG tablet Take 25 mg by mouth 2 times daily      traZODone (DESYREL) 100 MG tablet Take 100 mg by mouth nightly as needed      clonazePAM (KLONOPIN) 1 MG tablet Take 1 mg by mouth in the morning and 1 mg at noon and 1 mg before bedtime. HYDROcodone-acetaminophen (NORCO) 5-325 MG per tablet Take 1 tablet by mouth every 6 hours as needed. No current facility-administered medications for this visit. No Known Allergies  Patient Active Problem List    Diagnosis Date Noted    COVID-19 long hauler 10/19/2022     Priority: Medium    Chronic respiratory failure with hypoxia ([facility] 10/19/2022     Priority: Medium    Paroxysmal atrial fibrillation ([facility] 10/19/2022     Priority: Medium    Shortness of breath 10/19/2022     Priority: Medium      Past Surgical History:   Procedure Laterality Date    HERNIA REPAIR       Family History   Problem Relation Age of Onset    Other Mother         Arthritis    Cancer Paternal Grandfather     Hypertension Mother      Social History     Tobacco Use    Smoking status: Former     Packs/day: 0.50     Years: 20.00     Pack years: 10.00     Types: Cigarettes     Quit date:      Years since quittin.7    Smokeless tobacco: Never   Substance Use Topics    Alcohol use: Yes       ROS:    Constitutional:   Negative for fevers and unexplained weight loss. Eyes:   Negative for visual disturbance. ENT:   Negative for significant hearing loss and tinnitus. Respiratory:   Negative for hemoptysis. Cardiovascular:   Negative except as noted in HPI. Gastrointestinal:   Negative for melena and abdominal pain. Genitourinary:   Negative for hematuria, renal stones. Integumentary:   Negative for rash or non-healing wounds  Hematologic/Lymphatic:   Negative for excessive bleeding hx or clotting disorder. Musculoskeletal:  Negative for active, unexplained/severe joint pain.   Neurological:   Negative for stroke. Behavioral/Psych:   Negative for suicidal ideations. Endocrine:   Negative for uncontrolled diabetic symptoms including polyuria, polydipsia and poor wound healing. PHYSICAL EXAM:     /86   Pulse (!) 101   Ht 6' 1\" (1.854 m)   Wt 188 lb (85.3 kg)   BMI 24.80 kg/m²    General/Constitutional:   Alert and oriented x 3, no acute distress  HEENT:   normocephalic, atraumatic, moist mucous membranes  Neck:   No JVD or carotid bruits bilaterally  Cardiovascular:   regular rate and rhythm, no murmur/rub/gallop appreciated  Pulmonary:   clear to auscultation bilaterally, no respiratory distress  Abdomen:   soft, non-tender, non-distended  Ext:   No sig LE edema bilaterally  Skin:  warm and dry, no obvious rashes seen  Neuro:   no obvious sensory or motor deficits  Psychiatric:   normal mood and affect      EKG Today and independently reviewed by me: sinus rhythm, normal intervals and non-specific ST/T wave changes. Medical problems, medical history and test results were reviewed with the patient today.        Lab Results   Component Value Date/Time     03/14/2022 06:53 AM    K 4.4 03/14/2022 06:53 AM    CL 94 03/14/2022 06:53 AM    CO2 39 03/14/2022 06:53 AM    BUN 23 03/14/2022 06:53 AM    CREATININE 1.20 03/14/2022 06:53 AM    GLUCOSE 91 03/14/2022 06:53 AM    CALCIUM 10.3 03/14/2022 06:53 AM        Lab Results   Component Value Date    WBC 6.9 03/14/2022    HGB 9.0 (L) 03/14/2022    HCT 29.6 (L) 03/14/2022    MCV 90.5 03/14/2022     03/14/2022       Lab Results   Component Value Date    TSH 1.480 03/04/2022       No results found for: LABA1C  No results found for: EAG      No results found for: CHOL  No results found for: TRIG  No results found for: HDL  No results found for: LDLCHOLESTEROL, LDLCALC  No results found for: LABVLDL, VLDL  No results found for: CHOLHDLRATIO        I have Independently reviewed prior care notes, any ER records available, cardiac testing, labs and results with the patient and before seeing the patient today. Also independently reviewed outside records when available. ASSESSMENT:    Dilip Wynne was seen today for consultation. Diagnoses and all orders for this visit:    Encounter to establish care  -     EKG 12 Lead    COVID-19 long hauler    Chronic respiratory failure with hypoxia (HCC)    Paroxysmal atrial fibrillation (HCC)  -     Transthoracic echocardiogram (TTE) complete with contrast, bubble, strain, and 3D PRN; Future  -     Nuclear stress test with myocardial perfusion; Future    Shortness of breath  -     Transthoracic echocardiogram (TTE) complete with contrast, bubble, strain, and 3D PRN; Future  -     Nuclear stress test with myocardial perfusion; Future        PLAN:     Long COVID:  check echo, check RVSP. Follow, on oxygen. HUMPHREY/SOB/Resp Failure  prior smoker, Fam hx of CAD in father. Check echo and NST. Given these risk factors and symptoms as outlined, plan for NST. We did review options of NST, LHC, other stress testing and agreed to proceed with NST in our office. To ER for worsening angina. Plan on definitive LHC for worsening angina. HTN:  remain on BB. Follow, check echo. PAF:  sinus, remain on BB and ASA. Monitor for more symptoms. Follow for now. Anxiety mgmt is key as well as reviewed. Long extensive review with patient and father today about hospital course. The patient has been instructed to call with any angina or equivalent as reviewed today. All questions were answered with the patient voicing complete understanding. Patient has been instructed and agrees to call our office with any issues or other concerns related to their cardiac condition(s) and/or complaint(s).         Return for Return After Test.       ETHAN HAND DO  10/19/2022

## 2022-10-20 ENCOUNTER — APPOINTMENT (OUTPATIENT)
Dept: CARDIAC REHAB | Age: 47
End: 2022-10-20
Payer: COMMERCIAL

## 2022-10-24 ENCOUNTER — HOSPITAL ENCOUNTER (OUTPATIENT)
Dept: CARDIAC REHAB | Age: 47
Setting detail: RECURRING SERIES
Discharge: HOME | End: 2022-10-27
Payer: COMMERCIAL

## 2022-10-24 VITALS — WEIGHT: 189.8 LBS | BODY MASS INDEX: 25.04 KG/M2

## 2022-10-24 PROCEDURE — 94625 PHY/QHP OP PULM RHB W/O MNTR: CPT

## 2022-10-24 ASSESSMENT — EXERCISE STRESS TEST
PEAK_METS: 3
PEAK_BP: 108/78
PEAK_HR: 114

## 2022-10-25 ENCOUNTER — APPOINTMENT (OUTPATIENT)
Dept: CARDIAC REHAB | Age: 47
End: 2022-10-25
Payer: COMMERCIAL

## 2022-10-25 ENCOUNTER — TELEPHONE (OUTPATIENT)
Dept: PULMONOLOGY | Age: 47
End: 2022-10-25

## 2022-10-25 NOTE — TELEPHONE ENCOUNTER
LE: on or about 10/17, I attempted to call patient regarding appt. N/A. I left  message for patient to call me back to discuss f/u on 10/21 with Dr. Steve Beasley.

## 2022-10-26 ENCOUNTER — HOSPITAL ENCOUNTER (OUTPATIENT)
Dept: CARDIAC REHAB | Age: 47
Setting detail: RECURRING SERIES
Discharge: HOME | End: 2022-10-29
Payer: COMMERCIAL

## 2022-10-26 PROCEDURE — 94625 PHY/QHP OP PULM RHB W/O MNTR: CPT

## 2022-10-26 ASSESSMENT — EXERCISE STRESS TEST
PEAK_HR: 92
PEAK_METS: 3
PEAK_BP: 100/62

## 2022-10-27 ENCOUNTER — APPOINTMENT (OUTPATIENT)
Dept: CARDIAC REHAB | Age: 47
End: 2022-10-27
Payer: COMMERCIAL

## 2022-10-31 ENCOUNTER — HOSPITAL ENCOUNTER (OUTPATIENT)
Dept: CARDIAC REHAB | Age: 47
Setting detail: RECURRING SERIES
Discharge: HOME | End: 2022-11-03
Payer: COMMERCIAL

## 2022-10-31 VITALS — BODY MASS INDEX: 25.28 KG/M2 | WEIGHT: 191.6 LBS

## 2022-10-31 PROCEDURE — 94625 PHY/QHP OP PULM RHB W/O MNTR: CPT

## 2022-10-31 ASSESSMENT — EXERCISE STRESS TEST
PEAK_METS: 3
PEAK_HR: 105
PEAK_BP: 130/80

## 2022-11-01 ENCOUNTER — APPOINTMENT (OUTPATIENT)
Dept: CARDIAC REHAB | Age: 47
End: 2022-11-01
Payer: COMMERCIAL

## 2022-11-01 ENCOUNTER — TELEPHONE (OUTPATIENT)
Dept: CARDIOLOGY CLINIC | Age: 47
End: 2022-11-01

## 2022-11-01 NOTE — TELEPHONE ENCOUNTER
----- Message from Fely Berry DO sent at 10/28/2022  1:31 PM EDT -----  Please call the patient. NST was ok, nothing major or concerning. If having more angina (worsening HUMPHREY, CP, SOB), please let us know. Will review more at their follow up appointment and get plan for the future.     Thanks,   Prosper Dowling

## 2022-11-02 ENCOUNTER — HOSPITAL ENCOUNTER (OUTPATIENT)
Dept: CARDIAC REHAB | Age: 47
Setting detail: RECURRING SERIES
End: 2022-11-02
Payer: COMMERCIAL

## 2022-11-03 ENCOUNTER — HOSPITAL ENCOUNTER (OUTPATIENT)
Dept: CARDIAC REHAB | Age: 47
Setting detail: RECURRING SERIES
Discharge: HOME | End: 2022-11-06
Payer: COMMERCIAL

## 2022-11-03 ENCOUNTER — APPOINTMENT (OUTPATIENT)
Dept: CARDIAC REHAB | Age: 47
End: 2022-11-03
Payer: COMMERCIAL

## 2022-11-03 PROCEDURE — 94625 PHY/QHP OP PULM RHB W/O MNTR: CPT

## 2022-11-03 ASSESSMENT — EXERCISE STRESS TEST
PEAK_HR: 93
PEAK_BP: 102/58
PEAK_METS: 2.5
PEAK_BP: 130/80

## 2022-11-03 ASSESSMENT — LIFESTYLE VARIABLES: SMOKELESS_TOBACCO: NO

## 2022-11-04 ENCOUNTER — TELEPHONE (OUTPATIENT)
Dept: CARDIOLOGY CLINIC | Age: 47
End: 2022-11-04

## 2022-11-04 NOTE — TELEPHONE ENCOUNTER
----- Message from Brendan Rodriguez DO sent at 11/4/2022 10:19 AM EDT -----  Please call the patient. ECHO was ok, nothing major or concerning. If having more angina (worsening HUMPHREY, CP, SOB), please let us know. Will review more at follow up appointment and get plan for the future.     Thanks,   Ivonne Neighbours

## 2022-11-07 ENCOUNTER — HOSPITAL ENCOUNTER (OUTPATIENT)
Dept: CARDIAC REHAB | Age: 47
Setting detail: RECURRING SERIES
Discharge: HOME | End: 2022-11-10
Payer: COMMERCIAL

## 2022-11-07 VITALS — BODY MASS INDEX: 25.6 KG/M2 | WEIGHT: 194 LBS

## 2022-11-07 PROCEDURE — 94625 PHY/QHP OP PULM RHB W/O MNTR: CPT

## 2022-11-07 ASSESSMENT — EXERCISE STRESS TEST
PEAK_METS: 2.7
PEAK_HR: 106
PEAK_BP: 118/80

## 2022-11-08 ENCOUNTER — APPOINTMENT (OUTPATIENT)
Dept: CARDIAC REHAB | Age: 47
End: 2022-11-08
Payer: COMMERCIAL

## 2022-11-09 ENCOUNTER — HOSPITAL ENCOUNTER (OUTPATIENT)
Dept: CARDIAC REHAB | Age: 47
Setting detail: RECURRING SERIES
Discharge: HOME | End: 2022-11-12
Payer: COMMERCIAL

## 2022-11-09 PROCEDURE — 94625 PHY/QHP OP PULM RHB W/O MNTR: CPT

## 2022-11-09 ASSESSMENT — EXERCISE STRESS TEST
PEAK_BP: 102/66
PEAK_METS: 2.8
PEAK_HR: 111

## 2022-11-10 ENCOUNTER — APPOINTMENT (OUTPATIENT)
Dept: CARDIAC REHAB | Age: 47
End: 2022-11-10
Payer: COMMERCIAL

## 2022-11-14 ENCOUNTER — HOSPITAL ENCOUNTER (OUTPATIENT)
Dept: CARDIAC REHAB | Age: 47
Setting detail: RECURRING SERIES
Discharge: HOME | End: 2022-11-17
Payer: COMMERCIAL

## 2022-11-14 VITALS — BODY MASS INDEX: 26.02 KG/M2 | WEIGHT: 197.2 LBS

## 2022-11-14 PROCEDURE — 94625 PHY/QHP OP PULM RHB W/O MNTR: CPT

## 2022-11-14 ASSESSMENT — EXERCISE STRESS TEST
PEAK_BP: 120/70
PEAK_HR: 112
PEAK_METS: 2.8

## 2022-11-15 ENCOUNTER — APPOINTMENT (OUTPATIENT)
Dept: CARDIAC REHAB | Age: 47
End: 2022-11-15
Payer: COMMERCIAL

## 2022-11-16 ENCOUNTER — HOSPITAL ENCOUNTER (OUTPATIENT)
Dept: CARDIAC REHAB | Age: 47
Setting detail: RECURRING SERIES
Discharge: HOME | End: 2022-11-19
Payer: COMMERCIAL

## 2022-11-16 PROCEDURE — G0239 OTH RESP PROC, GROUP: HCPCS

## 2022-11-16 ASSESSMENT — EXERCISE STRESS TEST
PEAK_METS: 2.8
PEAK_BP: 112/60
PEAK_HR: 110

## 2022-11-17 ENCOUNTER — APPOINTMENT (OUTPATIENT)
Dept: CARDIAC REHAB | Age: 47
End: 2022-11-17
Payer: COMMERCIAL

## 2022-11-18 ENCOUNTER — TELEPHONE (OUTPATIENT)
Dept: CARDIOLOGY CLINIC | Age: 47
End: 2022-11-18

## 2022-11-18 NOTE — TELEPHONE ENCOUNTER
----- Message from [facility], DO sent at 11/18/2022  3:10 PM EST -----  See me in 3-4mos for follow up appt, talked with him today. Doing well, echo and NST ok.    Thanks

## 2022-11-21 ENCOUNTER — HOSPITAL ENCOUNTER (OUTPATIENT)
Dept: CARDIAC REHAB | Age: 47
Setting detail: RECURRING SERIES
End: 2022-11-21
Payer: COMMERCIAL

## 2022-11-22 ENCOUNTER — APPOINTMENT (OUTPATIENT)
Dept: CARDIAC REHAB | Age: 47
End: 2022-11-22
Payer: COMMERCIAL

## 2022-11-23 ENCOUNTER — APPOINTMENT (OUTPATIENT)
Dept: CARDIAC REHAB | Age: 47
End: 2022-11-23
Payer: COMMERCIAL

## 2022-11-24 ENCOUNTER — APPOINTMENT (OUTPATIENT)
Dept: CARDIAC REHAB | Age: 47
End: 2022-11-24
Payer: COMMERCIAL

## 2022-11-28 ENCOUNTER — HOSPITAL ENCOUNTER (OUTPATIENT)
Dept: CARDIAC REHAB | Age: 47
Setting detail: RECURRING SERIES
Discharge: HOME | End: 2022-12-01
Payer: COMMERCIAL

## 2022-11-28 VITALS — WEIGHT: 204 LBS | BODY MASS INDEX: 26.91 KG/M2

## 2022-11-28 PROCEDURE — 94625 PHY/QHP OP PULM RHB W/O MNTR: CPT

## 2022-11-28 ASSESSMENT — EXERCISE STRESS TEST
PEAK_BP: 112/70
PEAK_METS: 2.8
PEAK_HR: 94

## 2022-11-29 ENCOUNTER — APPOINTMENT (OUTPATIENT)
Dept: CARDIAC REHAB | Age: 47
End: 2022-11-29
Payer: COMMERCIAL

## 2022-11-30 ENCOUNTER — HOSPITAL ENCOUNTER (OUTPATIENT)
Dept: CARDIAC REHAB | Age: 47
Setting detail: RECURRING SERIES
Discharge: HOME | End: 2022-12-03
Payer: COMMERCIAL

## 2022-11-30 PROCEDURE — 94625 PHY/QHP OP PULM RHB W/O MNTR: CPT

## 2022-11-30 ASSESSMENT — EXERCISE STRESS TEST
PEAK_BP: 122/80
PEAK_HR: 90
PEAK_METS: 2.8

## 2022-12-01 ASSESSMENT — EXERCISE STRESS TEST: PEAK_BP: 126/80

## 2022-12-01 ASSESSMENT — LIFESTYLE VARIABLES: SMOKELESS_TOBACCO: NO

## 2022-12-05 ENCOUNTER — HOSPITAL ENCOUNTER (OUTPATIENT)
Dept: CARDIAC REHAB | Age: 47
Setting detail: RECURRING SERIES
Discharge: HOME | End: 2022-12-08
Payer: COMMERCIAL

## 2022-12-05 VITALS — BODY MASS INDEX: 26.99 KG/M2 | WEIGHT: 204.6 LBS

## 2022-12-05 PROCEDURE — 94625 PHY/QHP OP PULM RHB W/O MNTR: CPT

## 2022-12-05 ASSESSMENT — EXERCISE STRESS TEST
PEAK_BP: 110/70
PEAK_METS: 2.8
PEAK_HR: 102

## 2022-12-06 ASSESSMENT — PATIENT HEALTH QUESTIONNAIRE - PHQ9
5. POOR APPETITE OR OVEREATING: 1
1. LITTLE INTEREST OR PLEASURE IN DOING THINGS: 0
8. MOVING OR SPEAKING SO SLOWLY THAT OTHER PEOPLE COULD HAVE NOTICED. OR THE OPPOSITE, BEING SO FIGETY OR RESTLESS THAT YOU HAVE BEEN MOVING AROUND A LOT MORE THAN USUAL: 1
6. FEELING BAD ABOUT YOURSELF - OR THAT YOU ARE A FAILURE OR HAVE LET YOURSELF OR YOUR FAMILY DOWN: 2
7. TROUBLE CONCENTRATING ON THINGS, SUCH AS READING THE NEWSPAPER OR WATCHING TELEVISION: 2
4. FEELING TIRED OR HAVING LITTLE ENERGY: 1
SUM OF ALL RESPONSES TO PHQ QUESTIONS 1-9: 8
2. FEELING DOWN, DEPRESSED OR HOPELESS: 0
SUM OF ALL RESPONSES TO PHQ QUESTIONS 1-9: 8
10. IF YOU CHECKED OFF ANY PROBLEMS, HOW DIFFICULT HAVE THESE PROBLEMS MADE IT FOR YOU TO DO YOUR WORK, TAKE CARE OF THINGS AT HOME, OR GET ALONG WITH OTHER PEOPLE: 0
SUM OF ALL RESPONSES TO PHQ QUESTIONS 1-9: 8
SUM OF ALL RESPONSES TO PHQ9 QUESTIONS 1 & 2: 0
3. TROUBLE FALLING OR STAYING ASLEEP: 1
SUM OF ALL RESPONSES TO PHQ QUESTIONS 1-9: 8
9. THOUGHTS THAT YOU WOULD BE BETTER OFF DEAD, OR OF HURTING YOURSELF: 0

## 2022-12-07 ENCOUNTER — HOSPITAL ENCOUNTER (OUTPATIENT)
Dept: CARDIAC REHAB | Age: 47
Setting detail: RECURRING SERIES
Discharge: HOME | End: 2022-12-10
Payer: COMMERCIAL

## 2022-12-07 PROCEDURE — 94625 PHY/QHP OP PULM RHB W/O MNTR: CPT

## 2022-12-07 ASSESSMENT — EXERCISE STRESS TEST
PEAK_METS: 2.8
PEAK_BP: 116/78
PEAK_HR: 100

## 2022-12-12 ENCOUNTER — HOSPITAL ENCOUNTER (OUTPATIENT)
Dept: CARDIAC REHAB | Age: 47
Setting detail: RECURRING SERIES
Discharge: HOME | End: 2022-12-15
Payer: COMMERCIAL

## 2022-12-12 VITALS — WEIGHT: 207 LBS | BODY MASS INDEX: 27.31 KG/M2

## 2022-12-12 PROCEDURE — 94625 PHY/QHP OP PULM RHB W/O MNTR: CPT

## 2022-12-12 ASSESSMENT — EXERCISE STRESS TEST
PEAK_BP: 112/76
PEAK_HR: 102
PEAK_METS: 2.8

## 2022-12-16 ASSESSMENT — LIFESTYLE VARIABLES: SMOKELESS_TOBACCO: NO

## 2022-12-16 ASSESSMENT — EXERCISE STRESS TEST: PEAK_BP: 126/80

## 2023-01-09 ENCOUNTER — TELEPHONE (OUTPATIENT)
Dept: CARDIOLOGY CLINIC | Age: 48
End: 2023-01-09

## 2023-01-09 NOTE — TELEPHONE ENCOUNTER
Received forms from [address] [place] [address] the fax. Placed in the clinical care team member's basket (Сергей Kim

## 2023-01-13 ENCOUNTER — TELEPHONE (OUTPATIENT)
Dept: PULMONOLOGY | Age: 48
End: 2023-01-13

## 2023-01-13 NOTE — TELEPHONE ENCOUNTER
Called patient to discuss upcoming appt with Dr. Betito Epperson. After discussion with practice manager, director, and risk management, it is thought that a f/u with Dr. Emmanuel Dorantes would be best course of action for continuity of care and to clear the air from previous interactions. I explained that Dr. Emmanuel Dorantes is happy to see him to discuss treatment plan. Patient stated he is agreeable to this. Dr. Emmanuel Dorantes is here same date/time.   Appt changed in Epic and Dr. Emmanuel Dorantes notified. /dd

## 2023-01-16 ENCOUNTER — OFFICE VISIT (OUTPATIENT)
Dept: PULMONOLOGY | Age: 48
End: 2023-01-16
Payer: COMMERCIAL

## 2023-01-16 VITALS
SYSTOLIC BLOOD PRESSURE: 105 MMHG | DIASTOLIC BLOOD PRESSURE: 70 MMHG | WEIGHT: 227 LBS | RESPIRATION RATE: 14 BRPM | BODY MASS INDEX: 30.09 KG/M2 | HEIGHT: 73 IN | HEART RATE: 75 BPM | TEMPERATURE: 97.2 F | OXYGEN SATURATION: 98 %

## 2023-01-16 DIAGNOSIS — U09.9 POST-COVID-19 CONDITION: ICD-10-CM

## 2023-01-16 DIAGNOSIS — J96.11 CHRONIC RESPIRATORY FAILURE WITH HYPOXIA (HCC): Primary | ICD-10-CM

## 2023-01-16 DIAGNOSIS — J84.10 PULMONARY FIBROSIS (HCC): ICD-10-CM

## 2023-01-16 PROCEDURE — 99214 OFFICE O/P EST MOD 30 MIN: CPT | Performed by: INTERNAL MEDICINE

## 2023-01-16 RX ORDER — SODIUM CHLORIDE FOR INHALATION 3 %
4 VIAL, NEBULIZER (ML) INHALATION 2 TIMES DAILY
Qty: 240 ML | Refills: 11 | Status: SHIPPED | OUTPATIENT
Start: 2023-01-16

## 2023-01-16 NOTE — PROGRESS NOTES
[address] Jagruti Phelan. [address], [address] [facility]  [phone]    Patient Name:  Solomon Healy      YOB: 1975  Office Visit 1/16/2023    ASSESSMENT AND PLAN:  (Medical Decision Making)    Pt with prolonged illness due to covid, resulting in need for trach, stay at [facility], and rehab. Has recovered but still needing O2 with any exertion and often at rest.   Completed PT and now pulmonary rehab. Continues to exercise on his own. Trial of steroids fall 2022 with some but minimal improvement in CT scan. Weaned off. cPFT's with severe restriction and DLCO reduction. Clinically feels better with warm, moist air.     -set up with nebulizer machine with saline nebs. -try to get portable concentrator.   -cont advair bid.   -completed pulmonary rehad. Encouraged him to cont to exercise on his own.   -using IS and breather devices.   -suggested covid, flu, and pneumonia vaccines. Not sure that he will accept any of these  -discussed the role of lung transplant with him given his young age and few comorbidities. Not sure he would actually be a candidate at this point given stability but would be worth having him evaluated. He would like to think about this and let me know if he would like a referral.     F/u in 3 months          There are no diagnoses linked to this encounter. Denita Quiroz MD    Clinical time for encounter was 30 minutes. _________________________________________________________________________    HISTORY OF PRESENT ILLNESS:    Mr. Solomon Healy in our clinic today who presents with a No chief complaint on file. Dary Joe He has a history of former tobacco abuse (2 pk/yrs, quit in Jan 2022) here for new pt eval of post covid hypoxia. He was admitted from Jan to March 2022 (46 days) with covid related respiratory failure. He required trach placement. He grew staph epi from blood cutulres and was treated. He had renal failure requiring CRRT. Had a fib with RVR but did not require long term meds for this. Was discharged to [facility]. Was able to wean off vent and was decannulated while there. Was discharged to rehab. Required 2L O2 at rest and 4l with exertion. Got home April 13th. Some gradual improvement but ongoing weakness and SOB. Completed home PT and referred to pulm rehab. Was on 1-1.5L O2 at rest and 4L with exertion. At his last appt the plan was to get him into pulmonary rehab. PFT's (Ratio 90%, FVC 33%, TLC 39%, DLCO 32%), 6MW (), and CXR (showed ILD as well as 73cm lucency in RLL) were performed. 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 Because of the ILD with acute inflammation findings on CT scan it was suggested that he be started on prednisone with plan for 60mg daily x 1 month then repeat CT scan. Next scan 9/30 showed stable NSIP like changes. Plan at that time was to see him in his upcoming follow up appointment to assess symptoms and lay out a tapering strategy. He called back with concerns about being on this dose so he was decreased to 50mg over the phone with plans to further taper over the coming weeks. He cancelled his follow up appt on 10/21 and threatened legal action due to him being on steroids longer than he thought was appropriate. He states he came off the steroids in November. Tapered down the dose. He has not been seen by us since that time but returns today for follow up. He states that he is currently using 6L pulse dose. When he is at home he uses 2-3L continuous. When at rest at night he is on 2L at most, sometimes RA. Uses 4L continuous with exertion. He has a portable tank at home. He states he has gained a fair amount of weight, much of which is muscle. He is still participating in pulmonary rehab.  This ended in December but he has a free month that he is using 3 days a week. He is able to go through his daily routine and achieve everything he needs to. He is working on the [employer], arm cycle, stationary bike, some upper body strengthening as well. He states some days his breathing is easier. Worse with cold weather more than heat. He is currently using Advair 115 bid. Is using IS and up to 2250 cc which is the highest he has ever been. He has some lingering number in his left shin but thinks this is 80% better. REVIEW OF SYSTEMS:  10 point review of systems is negative except as reported in HPI. PHYSICAL EXAM:  There were no vitals filed for this visit. PERTINENT FINDINGS:   NAD  CTA B, no w/r/r  RRR, no m/r/g  No LE Edema. DIAGNOSTIC TESTS:                                                                                                             LABS: No results for input(s): HGB, HCT, TSH, NTPROBNP in the last 72 hours. Imaging:  CXR: No results found for this or any previous visit from the past 365 days. CT WITHOUT CONTRAST: No results found for this or any previous visit from the past 365 days. 8/26/22      CT 9/30/22      CT WITH CONTRAST: No results found for this or any previous visit from the past 365 days. CT HIGH RES: No results found for this or any previous visit from the past 365 days. CT PE PROTOCOL: No results found for this or any previous visit from the past 365 days. LDCT SCREENING: No results found for this or any previous visit from the past 365 days. PET SCAN: No results found for this or any previous visit from the past 365 days. PFTs:   Office Spirometry Results 8/31/2022   FVC (liters) (No Data)       Exercise Oximetry: No results found for this or any previous visit. Echo: No results found for this or any previous visit.     [facility] Reference Info:                                                                                                                  No past medical history on file.    Tobacco Use: Medium Risk    Smoking Tobacco Use: Former    Smokeless Tobacco Use: Never    Passive Exposure: Not on file     No Known Allergies  Current Outpatient Medications   Medication Instructions    aspirin 81 mg, Oral, DAILY    busPIRone (BUSPAR) 5 mg, Oral, 2 TIMES DAILY    clonazePAM (KLONOPIN) 1 mg, Oral, 3 TIMES DAILY    fluticasone-salmeterol (ADVAIR HFA) 115-21 MCG/ACT inhaler 2 puffs, Inhalation, 2 TIMES DAILY, Rinse mouth after use    gabapentin (NEURONTIN) 100 MG capsule TAKE 1 CAPSULE BY MOUTH THREE TIMES A DAY    HYDROcodone-acetaminophen (NORCO) 5-325 MG per tablet 1 tablet, Oral, EVERY 6 HOURS PRN    metoprolol tartrate (LOPRESSOR) 25 mg, Oral, 2 TIMES DAILY    predniSONE (DELTASONE) 60 mg, Oral, DAILY    sertraline (ZOLOFT) 50 mg, Oral, DAILY    traZODone (DESYREL) 100 mg, Oral, NIGHTLY PRN

## 2023-01-19 ENCOUNTER — TELEPHONE (OUTPATIENT)
Dept: CARDIOLOGY CLINIC | Age: 48
End: 2023-01-19

## 2023-01-19 NOTE — TELEPHONE ENCOUNTER
Received forms of the fax from [facility] .  Placed in clinical care team basket ( janny ) Maria Esther Torres

## 2023-01-24 ENCOUNTER — TELEPHONE (OUTPATIENT)
Dept: CARDIOLOGY CLINIC | Age: 48
End: 2023-01-24

## 2023-01-24 DIAGNOSIS — R06.02 SHORTNESS OF BREATH: ICD-10-CM

## 2023-01-24 DIAGNOSIS — I48.0 PAROXYSMAL ATRIAL FIBRILLATION (HCC): Primary | ICD-10-CM

## 2023-01-24 NOTE — TELEPHONE ENCOUNTER
Received [employer]  long term Disability papers ,sent back to [facility] -01/24/23

## 2023-01-26 NOTE — TELEPHONE ENCOUNTER
Paperwork completed and last office note printed. Given to [facility] for Dr Coral Soto to sign and fax.

## 2023-02-20 NOTE — TELEPHONE ENCOUNTER
Patient Refill Request     Last Office Visit: 01/16/23 with Dr. Beltran     When they were supposed to follow up: 3 months     Upcoming Office Visit: 05/18/23 with Dr. Beltran     Refills Requested: Sodium Chloride 3%     Type of refill: 30 Day     Requested Pharmacy: [facility]     Is prescription pended? Yes

## 2023-02-22 RX ORDER — SODIUM CHLORIDE FOR INHALATION 3 %
4 VIAL, NEBULIZER (ML) INHALATION 2 TIMES DAILY
Qty: 240 ML | Refills: 11 | Status: SHIPPED | OUTPATIENT
Start: 2023-02-22

## 2023-02-23 ENCOUNTER — OFFICE VISIT (OUTPATIENT)
Dept: CARDIOLOGY CLINIC | Age: 48
End: 2023-02-23
Payer: COMMERCIAL

## 2023-02-23 VITALS
HEART RATE: 80 BPM | HEIGHT: 73 IN | BODY MASS INDEX: 31.14 KG/M2 | DIASTOLIC BLOOD PRESSURE: 82 MMHG | SYSTOLIC BLOOD PRESSURE: 130 MMHG | WEIGHT: 235 LBS

## 2023-02-23 DIAGNOSIS — U09.9 COVID-19 LONG HAULER: Primary | ICD-10-CM

## 2023-02-23 DIAGNOSIS — I48.0 PAROXYSMAL ATRIAL FIBRILLATION (HCC): ICD-10-CM

## 2023-02-23 DIAGNOSIS — J96.11 CHRONIC RESPIRATORY FAILURE WITH HYPOXIA (HCC): ICD-10-CM

## 2023-02-23 PROCEDURE — 99214 OFFICE O/P EST MOD 30 MIN: CPT | Performed by: INTERNAL MEDICINE

## 2023-02-23 NOTE — PROGRESS NOTES
[address], [address] [facility], [address] [place] [address]  PHONE: [phone]     23    NAME:  Isidra Villatoro  : 1975  MRN: [id number]       SUBJECTIVE:   Isidra Villatoro is a 50 y.o. male seen for a follow up visit regarding the following:     Chief Complaint   Patient presents with    Atrial Fibrillation       HPI:   Was working for [employer], became ill in 2022  Had prolonged illness with COVID, trach, stay at [facility]. Former tobacco smoker, quit in 2022. Had renal failure needing CRRT, pAfib. Prolonged 2 mo stay. NST 10/2022: low risk of myocardial ischemia. Echo 2022: EF normal, no pHTN    Feeling better now, less HUMPHREY, less SOB. On less oxygen. More active. No edema. Patient denies recent history of orthopnea, PND, excessive dizziness and/or syncope. Some anxiety issues as well. Father with PPM, PCI, seen by EP. [facility] 2022 Echo:  · The left ventricular systolic function is normal (55-65%). · Unable to assess left ventricular diastolic function. · The right ventricle is dilated. · The right ventricular systolic function is normal.   · Mild tricuspid valve regurgitation. Past Medical History, Past Surgical History, Family history, Social History, and Medications were all reviewed with the patient today and updated as necessary. Current Outpatient Medications   Medication Sig Dispense Refill    sodium chloride, Inhalant, 3 % nebulizer solution Take 4 mLs by nebulization in the morning and at bedtime 240 mL 11    OXYGEN Inhale into the lungs 2-3 LPM      fluticasone-salmeterol (ADVAIR HFA) 115-21 MCG/ACT inhaler Inhale 2 puffs into the lungs 2 times daily Rinse mouth after use 1 each 11    sertraline (ZOLOFT) 50 MG tablet Take 50 mg by mouth daily      busPIRone (BUSPAR) 5 MG tablet Take 5 mg by mouth 2 times daily      gabapentin (NEURONTIN) 100 MG capsule in the morning and at bedtime.       metoprolol tartrate (LOPRESSOR) 25 MG tablet Take 25 mg by mouth 2 times daily      traZODone (DESYREL) 100 MG tablet Take 100 mg by mouth nightly as needed      clonazePAM (KLONOPIN) 1 MG tablet Take 1 mg by mouth in the morning and 1 mg at noon and 1 mg before bedtime. aspirin 81 MG EC tablet Take 81 mg by mouth daily      predniSONE (DELTASONE) 20 MG tablet Take 3 tablets by mouth daily (Patient taking differently: Take 20 mg by mouth daily) 90 tablet 2    HYDROcodone-acetaminophen (NORCO) 5-325 MG per tablet Take 1 tablet by mouth every 6 hours as needed. No current facility-administered medications for this visit. No Known Allergies  Patient Active Problem List    Diagnosis Date Noted    COVID-19 long hauler 10/19/2022     Priority: Medium    Chronic respiratory failure with hypoxia ([facility] 10/19/2022     Priority: Medium    Paroxysmal atrial fibrillation ([facility] 10/19/2022     Priority: Medium    Shortness of breath 10/19/2022     Priority: Medium      Past Surgical History:   Procedure Laterality Date    HERNIA REPAIR       Family History   Problem Relation Age of Onset    Other Mother         Arthritis    Cancer Paternal Grandfather     Hypertension Mother      Social History     Tobacco Use    Smoking status: Former     Packs/day: 0.50     Years: 20.00     Pack years: 10.00     Types: Cigarettes     Quit date:      Years since quittin.1    Smokeless tobacco: Never   Substance Use Topics    Alcohol use: Yes         ROS:    No obvious pertinent positives on review of systems except for what was outlined in the HPI today.       PHYSICAL EXAM:     /82   Pulse 80   Ht 6' 1\" (1.854 m)   Wt 235 lb (106.6 kg)   BMI 31.00 kg/m²    General/Constitutional:   Alert and oriented x 3, no acute distress  HEENT:   normocephalic, atraumatic, moist mucous membranes  Neck:   No JVD or carotid bruits bilaterally  Cardiovascular:   regular rate and rhythm, no murmur/rub/gallop appreciated  Pulmonary:   clear to auscultation bilaterally, no respiratory distress  Abdomen:   soft, non-tender, non-distended  Ext:   No sig LE edema bilaterally  Skin:  warm and dry, no obvious rashes seen  Neuro:   no obvious sensory or motor deficits  Psychiatric:   normal mood and affect      Lab Results   Component Value Date/Time     03/14/2022 06:53 AM    K 4.4 03/14/2022 06:53 AM    CL 94 03/14/2022 06:53 AM    CO2 39 03/14/2022 06:53 AM    BUN 23 03/14/2022 06:53 AM    CREATININE 1.20 03/14/2022 06:53 AM    GLUCOSE 91 03/14/2022 06:53 AM    CALCIUM 10.3 03/14/2022 06:53 AM        Lab Results   Component Value Date    WBC 6.9 03/14/2022    HGB 9.0 (L) 03/14/2022    HCT 29.6 (L) 03/14/2022    MCV 90.5 03/14/2022     03/14/2022       Lab Results   Component Value Date    TSH 1.480 03/04/2022       No results found for: LABA1C  No results found for: EAG    No results found for: CHOL  No results found for: TRIG  No results found for: HDL  No results found for: LDLCHOLESTEROL, LDLCALC  No results found for: LABVLDL, VLDL  No results found for: CHOLHDLRATIO        I have Independently reviewed prior care notes, any ER records available, cardiac testing, labs and results with the patient and before seeing the patient today. Also independently reviewed outside records when available. ASSESSMENT:    Yamil Hebert was seen today for atrial fibrillation. Diagnoses and all orders for this visit:    COVID-19 long hauler    Chronic respiratory failure with hypoxia (HCC)    Paroxysmal atrial fibrillation (HCC)        PLAN:   Long COVID:  echo ok, no pHTN, EF normal.    HUMPHREY/SOB/Resp Failure  prior smoker, Fam hx of CAD in father. Echo and NST ok. Patient presents for followup of recent cardiac stress testing. The stress test showed normal LV function with no evidence of ischemia.   We discussed the reassuring results of the stress test.  We also discused the importance of ongoing risk factor modification for the prevention of future cardiovascular events. A healthy lifestyle was discussed. This would include heart-healthy diet, regular aerobic exercises, maintenance of desirable body weight and avoidance of tobacco products  Patient is encouraged to contact the office with any recurrent symptoms or concerns as reviewed today. All questions were answered with the patient voicing understanding. HTN:  remain on BB. PAF:  sinus, remain on BB and ASA. Anxiety mgmt is key as well as reviewed. Labs soon      Patient has been instructed and agrees to call our office with any issues or other concerns related to their cardiac condition(s) and/or complaint(s). Return in about 6 months (around 8/23/2023).        Byron Catalan, DO  2/23/2023

## 2023-03-09 RX ORDER — SODIUM CHLORIDE FOR INHALATION 3 %
4 VIAL, NEBULIZER (ML) INHALATION 2 TIMES DAILY
Qty: 240 ML | Refills: 11 | Status: SHIPPED | OUTPATIENT
Start: 2023-03-09

## 2023-03-09 NOTE — TELEPHONE ENCOUNTER
Patient Refill Request     Last Office Visit: 01/16/2023 with Dr. Beltran     When they were supposed to follow up: 3 months     Upcoming Office Visit: 05/18/23 with Dr. Beltran     Refills Requested: Sodium Chloride 3%     Type of refill: 30 day     Requested Pharmacy: [facility]     Is prescription pended? Yes    Schedulers: Can we cancel the upcoming appointment with Dr. Beltran on 05/18/23? Patient would prefer to see Dr. Greer instead.  Thank you so much! /    Dr. Greer: Please sign off on prescription if it's appropriate.  Thank you so much! // Francesca Atwood [facility]

## 2023-03-10 ENCOUNTER — TELEPHONE (OUTPATIENT)
Dept: PULMONOLOGY | Age: 48
End: 2023-03-10

## 2023-07-26 ENCOUNTER — OFFICE VISIT (OUTPATIENT)
Dept: PULMONOLOGY | Age: 48
End: 2023-07-26
Payer: COMMERCIAL

## 2023-07-26 VITALS
WEIGHT: 253 LBS | DIASTOLIC BLOOD PRESSURE: 80 MMHG | BODY MASS INDEX: 33.53 KG/M2 | RESPIRATION RATE: 20 BRPM | TEMPERATURE: 98 F | SYSTOLIC BLOOD PRESSURE: 110 MMHG | HEIGHT: 73 IN | HEART RATE: 81 BPM | OXYGEN SATURATION: 99 %

## 2023-07-26 DIAGNOSIS — G47.8 NON-RESTORATIVE SLEEP: ICD-10-CM

## 2023-07-26 DIAGNOSIS — J96.11 CHRONIC RESPIRATORY FAILURE WITH HYPOXIA (HCC): ICD-10-CM

## 2023-07-26 DIAGNOSIS — R06.83 SNORING: Primary | ICD-10-CM

## 2023-07-26 DIAGNOSIS — R29.818 SUSPECTED SLEEP APNEA: ICD-10-CM

## 2023-07-26 DIAGNOSIS — J30.1 ALLERGIC RHINITIS DUE TO POLLEN, UNSPECIFIED SEASONALITY: ICD-10-CM

## 2023-07-26 DIAGNOSIS — J84.89 NSIP (NONSPECIFIC INTERSTITIAL PNEUMONIA) (HCC): Primary | ICD-10-CM

## 2023-07-26 DIAGNOSIS — U09.9 POST-COVID-19 CONDITION: ICD-10-CM

## 2023-07-26 DIAGNOSIS — R06.09 DOE (DYSPNEA ON EXERTION): ICD-10-CM

## 2023-07-26 PROCEDURE — 99215 OFFICE O/P EST HI 40 MIN: CPT | Performed by: INTERNAL MEDICINE

## 2023-07-26 RX ORDER — SODIUM CHLORIDE FOR INHALATION 3 %
4 VIAL, NEBULIZER (ML) INHALATION 2 TIMES DAILY
Qty: 240 ML | Refills: 11 | Status: SHIPPED | OUTPATIENT
Start: 2023-07-26

## 2023-07-26 RX ORDER — ALBUTEROL SULFATE 2.5 MG/3ML
2.5 SOLUTION RESPIRATORY (INHALATION) EVERY 6 HOURS PRN
Qty: 120 EACH | Refills: 11 | Status: SHIPPED | OUTPATIENT
Start: 2023-07-26

## 2023-07-26 ASSESSMENT — ENCOUNTER SYMPTOMS
ALLERGIC/IMMUNOLOGIC NEGATIVE: 1
EYES NEGATIVE: 1
SINUS PRESSURE: 0
GASTROINTESTINAL NEGATIVE: 1
SHORTNESS OF BREATH: 1
CHEST TIGHTNESS: 0

## 2023-07-26 NOTE — PROGRESS NOTES
[address] V[address]. [address] [facility], [address] [place] [address]  [phone]    Patient Name:  Yamilet Serrato  YOB: 1975      Office Visit 7/26/2023    Chief Complaint   Patient presents with    Other     Chronic respiratory failure with hypoxia        HISTORY OF PRESENT ILLNESS:    This patient was seen for the first time after being seen by my colleagues in other doctors. To recap:  Patient with prior history of severe COVID infection requiring hospitalization, intubation, tracheostomy/end-stage renal disease requiring dialysis/cardiac arrest either 1 or 2 episodes/atrial fibrillation with RVR at [facility]. Patient gradually improved required oxygen 24 hours a day and increase with exertion. Was even at [facility] in pulmonary rehab. 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 Her chart apparently there was some prior cancellation of visits due to possible litigation issues but eventually tapered himself off steroids in November. He apparently was on 6 L pulsed dose and continuous 2 to 3 L at home. When seen by Dr. Crain June he was on Advair /21 2 puffs 1-2 times a day, using as needed albuterol inhaler or nebulizer. Patient was using incentive spirometer at over 2000 L. And was slowly improving. Last CAT scan was in September which showed stable NSIP. Today he is reporting that he feels fairly okay. He is compliant with his oxygen 24 hours a day. He is still using Advair HFA at least 1-2 times a day. Has a rescue inhaler but uses it about 2 puffs as needed. Indicates it does reduce his heart does not really like to use it. Was using 3% saline to help expectorate but indicates he did run out. Currently denies any wheezing, fever, chills, night sweats hemoptysis.   He did indicate he had a coughing fit about 2 weeks ago, but did not have any

## 2023-08-02 ENCOUNTER — HOSPITAL ENCOUNTER (OUTPATIENT)
Dept: CT IMAGING | Age: 48
Discharge: HOME | End: 2023-08-05
Attending: INTERNAL MEDICINE
Payer: COMMERCIAL

## 2023-08-02 DIAGNOSIS — J84.89 NSIP (NONSPECIFIC INTERSTITIAL PNEUMONIA) (HCC): ICD-10-CM

## 2023-08-02 PROCEDURE — 71250 CT THORAX DX C-: CPT

## 2023-08-09 ENCOUNTER — TELEPHONE (OUTPATIENT)
Dept: PULMONOLOGY | Age: 48
End: 2023-08-09

## 2023-08-09 NOTE — TELEPHONE ENCOUNTER
Spoke with the patient and he stated that he received Dr. Vickie Howard and that he understood that was discussed and did not have any further questions or concerns at this time.  // Leopold Finger

## 2023-08-09 NOTE — TELEPHONE ENCOUNTER
----- Message from Herbert Atkinson MD sent at 8/8/2023  8:54 AM EDT -----  I tried to call the patient and had to leave a message. I reviewed his CAT scan with radiology since I did not agree with the report where there is progression of disease with increased bolus formation. Radiologist today agreed with my assessment that if anything things appear stable and the bullous disease is actually less. Personally I think that the groundglass changes in the upper lobe are little less which is good, since he is actually doing a little bit better. Please let the patient know that is what I discussed with radiology today. The radiologist who read the report will be informed-per the radiology department today. Angella Holbrook MD

## 2023-10-02 ENCOUNTER — OFFICE VISIT (OUTPATIENT)
Age: 48
End: 2023-10-02
Payer: COMMERCIAL

## 2023-10-02 VITALS
HEART RATE: 75 BPM | WEIGHT: 257 LBS | DIASTOLIC BLOOD PRESSURE: 86 MMHG | SYSTOLIC BLOOD PRESSURE: 118 MMHG | BODY MASS INDEX: 34.06 KG/M2 | HEIGHT: 73 IN

## 2023-10-02 DIAGNOSIS — I48.0 PAROXYSMAL ATRIAL FIBRILLATION (HCC): Primary | ICD-10-CM

## 2023-10-02 DIAGNOSIS — U09.9 COVID-19 LONG HAULER: ICD-10-CM

## 2023-10-02 PROCEDURE — 93000 ELECTROCARDIOGRAM COMPLETE: CPT | Performed by: INTERNAL MEDICINE

## 2023-10-02 PROCEDURE — 99214 OFFICE O/P EST MOD 30 MIN: CPT | Performed by: INTERNAL MEDICINE

## 2023-11-16 ENCOUNTER — NURSE ONLY (OUTPATIENT)
Dept: PULMONOLOGY | Age: 48
End: 2023-11-16

## 2023-11-16 ENCOUNTER — OFFICE VISIT (OUTPATIENT)
Dept: PULMONOLOGY | Age: 48
End: 2023-11-16
Payer: COMMERCIAL

## 2023-11-16 VITALS
RESPIRATION RATE: 20 BRPM | OXYGEN SATURATION: 99 % | DIASTOLIC BLOOD PRESSURE: 87 MMHG | SYSTOLIC BLOOD PRESSURE: 139 MMHG | HEIGHT: 71 IN | TEMPERATURE: 98 F | HEART RATE: 89 BPM | WEIGHT: 254 LBS | BODY MASS INDEX: 35.56 KG/M2

## 2023-11-16 DIAGNOSIS — R29.818 SUSPECTED SLEEP APNEA: ICD-10-CM

## 2023-11-16 DIAGNOSIS — U09.9 POST-COVID-19 CONDITION: ICD-10-CM

## 2023-11-16 DIAGNOSIS — J30.1 ALLERGIC RHINITIS DUE TO POLLEN, UNSPECIFIED SEASONALITY: ICD-10-CM

## 2023-11-16 DIAGNOSIS — J84.9 ILD (INTERSTITIAL LUNG DISEASE) (HCC): Primary | ICD-10-CM

## 2023-11-16 DIAGNOSIS — R06.09 DOE (DYSPNEA ON EXERTION): ICD-10-CM

## 2023-11-16 DIAGNOSIS — J96.11 CHRONIC RESPIRATORY FAILURE WITH HYPOXIA (HCC): ICD-10-CM

## 2023-11-16 DIAGNOSIS — J84.89 NSIP (NONSPECIFIC INTERSTITIAL PNEUMONIA) (HCC): ICD-10-CM

## 2023-11-16 DIAGNOSIS — G47.8 NON-RESTORATIVE SLEEP: ICD-10-CM

## 2023-11-16 DIAGNOSIS — J84.89 NSIP (NONSPECIFIC INTERSTITIAL PNEUMONIA) (HCC): Primary | ICD-10-CM

## 2023-11-16 LAB
FEF25-27, POC: 2.49 L/S
FET, POC: NORMAL
FEV 1 , POC: 1.93 L
FEV1/FVC, POC: NORMAL
FVC, POC: NORMAL
LUNG AGE, POC: NORMAL
PEF, POC: 4.54 L/S

## 2023-11-16 PROCEDURE — 99215 OFFICE O/P EST HI 40 MIN: CPT | Performed by: INTERNAL MEDICINE

## 2023-11-16 RX ORDER — ALBUTEROL SULFATE 2.5 MG/3ML
2.5 SOLUTION RESPIRATORY (INHALATION) ONCE
Status: DISCONTINUED | OUTPATIENT
Start: 2023-11-16 | End: 2023-11-16

## 2023-11-16 ASSESSMENT — ENCOUNTER SYMPTOMS
ALLERGIC/IMMUNOLOGIC NEGATIVE: 1
SINUS PRESSURE: 0
CHEST TIGHTNESS: 0
GASTROINTESTINAL NEGATIVE: 1
SHORTNESS OF BREATH: 1
EYES NEGATIVE: 1

## 2023-11-16 NOTE — PROGRESS NOTES
with him. I told him I disagreed with the radiologist interpretation and he is aware. Colony:       No data to display                  DIAGNOSTIC TESTS:                                                                                                             LABS: No results for input(s): HGB, HCT, TSH, NTPROBNP in the last 72 hours. Imaging:  CXR: No results found for this or any previous visit from the past 365 days. CT WITHOUT CONTRAST: No results found for this or any previous visit from the past 365 days. 8/26/22       CT 9/30/22       CT WITH CONTRAST: CT scan in August 2023 shows improvement of some of the NSIP/lung changes. PFTs: 11/16/2023  CPFT 11/16/23 -severe restriction. Lung volumes confirm restriction. Marked diffusion impairment. FVC is 2.28 L 45%. FEV1 1.93 L 48%. DLCO is 2.77 L 39%. DLCO is 38%. Compared to study done in September 14, 2022, there has been an improvement in FVC and FEV1 and DLCO by 37%, 29% and 18%. TLC shows no improvement. Exercise Oximetry: 7/26/2023 patient ambulated today for about 8 minutes. Starting saturation 93% heart rate 98 bpm on 1 L/min oxygen. With exertion oxygen saturation Going down until Eventually Required 4 L/Min. With That Lowest Saturation Noted to Be 90% with Maximal Heart Rate Being 108 Bpm.  Did Have Some Dyspnea but a Little Better on 4 L Than He Did at 3 L. Ambulatory saturation 11/16/2023-on room air saturation 95% with heart rate 82 bpm.  With exertion, saturation went down to 84% have to go back to pulsed dose 4 L/min saturation then improving lowest 89% maximum heart rate 104 bpm.  Patient at the end of the study saturation 96% with heart rate 100 bpm.  Tolerated well. Echo: No results found for this or any previous visit. History reviewed. No pertinent past medical history.       Patient Active Problem List   Diagnosis    COVID-19 long hauler    Chronic respiratory failure with hypoxia ([address])

## 2023-12-18 ENCOUNTER — PATIENT MESSAGE (OUTPATIENT)
Dept: PULMONOLOGY | Age: 48
End: 2023-12-18

## 2023-12-19 ENCOUNTER — PATIENT MESSAGE (OUTPATIENT)
Dept: PULMONOLOGY | Age: 48
End: 2023-12-19

## 2023-12-19 DIAGNOSIS — J84.9 ILD (INTERSTITIAL LUNG DISEASE) (HCC): Primary | ICD-10-CM

## 2023-12-20 NOTE — TELEPHONE ENCOUNTER
Left patient message via voicemail to please give me a call as soon as they are available. // Francesca Atwood M.A.

## 2023-12-26 ENCOUNTER — TELEPHONE (OUTPATIENT)
Age: 48
End: 2023-12-26

## 2023-12-27 NOTE — TELEPHONE ENCOUNTER
Dr. Greer, can you please clarify? I also placed the form in your blue folder.  Please review as soon as you are available.  Thank you so much! // Francesca MASTERSON

## 2023-12-27 NOTE — TELEPHONE ENCOUNTER
These are the 2nd set of form received for the patient. The first set of forms were faxed to [address] [place] [address] on 12/18/23 (fax confirmation received). A  note was placed on the forms asking them to defer to Pulmonology for Long Term Disability needs and restrictions, per Dr Kaia Forte. Disability forms are currently being filled out by Pulmonology. I re-faxed the previous forms along with the records requested (2 office notes) and the pt's signed medical release form. A note was placed on the front page stating all restrictions, treatment plan, and work status needs to go through the pt's pulmonologist. Fax confirmation received . I also contacted [address] [place] [address] and spoke to FADUMO (kolton KAPLAN). I stated that this was the 2nd request received by them and that the first was faxed to on 12/18/23 asking them to defer to Pulmonology. He states they did receive the first request & that he will send a message to his team to stop forms from being sent to our office.

## 2024-01-08 NOTE — TELEPHONE ENCOUNTER
Referral for Pulmonary Rehab has been established.  Patient has been notified about paperwork and referral by Deanne JUAREZ (Sleep Center Lead).  I also added a 3 step walk at the patient's CPFT visit.  Patient has been made aware that if they have any further questions or concern to please give our office a call. // Francesca Atwood [facility]

## 2024-01-24 ENCOUNTER — NURSE ONLY (OUTPATIENT)
Dept: PULMONOLOGY | Age: 49
End: 2024-01-24

## 2024-01-24 DIAGNOSIS — U09.9 POST-COVID-19 CONDITION: ICD-10-CM

## 2024-01-24 DIAGNOSIS — J84.89 NSIP (NONSPECIFIC INTERSTITIAL PNEUMONIA) (HCC): Primary | ICD-10-CM

## 2024-01-24 LAB
FEF25-27, POC: 3.15 L/S
FET, POC: NORMAL
FEV 1 , POC: 2 L
FEV1/FVC, POC: NORMAL
FVC, POC: NORMAL
LUNG AGE, POC: NORMAL
PEF, POC: 5.59 L/S

## 2024-01-24 NOTE — PROGRESS NOTES
[facility]monary [facility] Critical [facility]  [facility]  [address] [facility], [address]   [place] [id number]  T: [phone]  F: [phone]       3- Step Oxygen Qualifying Walk    Patient's Name Santos Cordon   Age 48 y.o.    Gender male   Ordering Provider  CLINT CAMPOS MD              Heart Rate   Oxygen SAT RA or   w / O2 (LPM)?    Patient State (walking, sitting, etc.)     Comments    RESTING  95 83 RA sitting     90 115 RA walking     85 113 RA walking Started 2L pulse O2     88 110 2L pulse standing Up to 4L pulse O2    96 104 4L pulse standing     97 98 4L pulse walking     94 104 4L pulse walking            POST TEST  98 82 4L pulse sitting          Supplemental oxygen during the test: YES - 4 LPM.                Tech comments:Patients O2 was set for pulse dose during test.    Test Performed by: ARMOND SLAUGHTER RCP

## 2024-01-30 ENCOUNTER — TELEPHONE (OUTPATIENT)
Dept: SLEEP MEDICINE | Age: 49
End: 2024-01-30

## 2024-01-30 NOTE — TELEPHONE ENCOUNTER
----- Message from Adonay Greer MD sent at 1/28/2024  4:22 PM EST -----  The study shows some improvement in lung function. Tell pateint we can go over it in the next visit. Please let the patient know.    Adonay Greer MD

## 2024-04-18 ENCOUNTER — OFFICE VISIT (OUTPATIENT)
Age: 49
End: 2024-04-18
Payer: COMMERCIAL

## 2024-04-18 VITALS
HEART RATE: 82 BPM | DIASTOLIC BLOOD PRESSURE: 98 MMHG | WEIGHT: 260.3 LBS | SYSTOLIC BLOOD PRESSURE: 136 MMHG | BODY MASS INDEX: 34.5 KG/M2 | HEIGHT: 73 IN

## 2024-04-18 DIAGNOSIS — I48.0 PAROXYSMAL ATRIAL FIBRILLATION (HCC): Primary | ICD-10-CM

## 2024-04-18 DIAGNOSIS — J96.11 CHRONIC RESPIRATORY FAILURE WITH HYPOXIA (HCC): ICD-10-CM

## 2024-04-18 PROCEDURE — 99214 OFFICE O/P EST MOD 30 MIN: CPT | Performed by: INTERNAL MEDICINE

## 2024-04-18 RX ORDER — FLUTICASONE PROPIONATE AND SALMETEROL 100; 50 UG/1; UG/1
1 POWDER RESPIRATORY (INHALATION) 2 TIMES DAILY
COMMUNITY
Start: 2024-04-09

## 2024-04-18 NOTE — PROGRESS NOTES
2 [facility], [address] [place] [address]  PHONE: [phone]     24    NAME:  Santos Cordon  : 1975  MRN: [id number]       SUBJECTIVE:   Santos Cordon is a 49 y.o. male seen for a follow up visit regarding the following:     Chief Complaint   Patient presents with    Atrial Fibrillation       HPI:   Was working for [employer], became ill in 2022  Had prolonged illness with COVID, trach, stay at [facility]. Former tobacco smoker, quit in 2022.  Had renal failure needing CRRT, pAfib.  Prolonged 2 mo stay.   NST 10/2022: low risk of myocardial ischemia.  Echo 2022: EF normal, no pHTN     On oxygen, more needed for exertion.    Feeling better now, less HUMPHREY, less SOB.  No CP, angina.    No new palp.   Patient denies recent history of orthopnea, PND, excessive dizziness and/or syncope.        Father with PPM, PCI, seen by EP.           Past Medical History, Past Surgical History, Family history, Social History, and Medications were all reviewed with the patient today and updated as necessary.     Current Outpatient Medications   Medication Sig Dispense Refill    WIXELA INHUB 100-50 MCG/ACT AEPB diskus inhaler Inhale 1 puff into the lungs 2 times daily      sodium chloride, Inhalant, 3 % nebulizer solution Take 4 mLs by nebulization in the morning and at bedtime 240 mL 11    OXYGEN Inhale into the lungs 2-3 LPM      sertraline (ZOLOFT) 50 MG tablet Take 1 tablet by mouth daily      busPIRone (BUSPAR) 5 MG tablet Take 1 tablet by mouth once      gabapentin (NEURONTIN) 100 MG capsule in the morning and at bedtime.      metoprolol tartrate (LOPRESSOR) 25 MG tablet Take 1 tablet by mouth 2 times daily      albuterol (PROVENTIL) (2.5 MG/3ML) 0.083% nebulizer solution Take 3 mLs by nebulization every 6 hours as needed for Wheezing If this patient is Medicare please file under Medicare Part B DX: NSIP (nonspecific interstitial pneumonia) (HCC) [J84.89] 120 each 11

## 2024-11-26 ENCOUNTER — OFFICE VISIT (OUTPATIENT)
Age: 49
End: 2024-11-26
Payer: COMMERCIAL

## 2024-11-26 VITALS
BODY MASS INDEX: 36.18 KG/M2 | WEIGHT: 273 LBS | HEART RATE: 73 BPM | DIASTOLIC BLOOD PRESSURE: 96 MMHG | SYSTOLIC BLOOD PRESSURE: 118 MMHG | HEIGHT: 73 IN

## 2024-11-26 DIAGNOSIS — I48.0 PAROXYSMAL ATRIAL FIBRILLATION (HCC): Primary | ICD-10-CM

## 2024-11-26 DIAGNOSIS — U09.9 COVID-19 LONG HAULER: ICD-10-CM

## 2024-11-26 DIAGNOSIS — J96.11 CHRONIC RESPIRATORY FAILURE WITH HYPOXIA: ICD-10-CM

## 2024-11-26 PROCEDURE — 93000 ELECTROCARDIOGRAM COMPLETE: CPT | Performed by: INTERNAL MEDICINE

## 2024-11-26 PROCEDURE — 99214 OFFICE O/P EST MOD 30 MIN: CPT | Performed by: INTERNAL MEDICINE

## 2024-11-26 NOTE — PROGRESS NOTES
2 [facility], [address] [place] [address]  PHONE: [phone]     24    NAME:  Santos Cordon  : 1975  MRN: [id number]       SUBJECTIVE:   Santos Cordon is a 49 y.o. male seen for a follow up visit regarding the following:     Chief Complaint   Patient presents with    Atrial Fibrillation       HPI:   Was working for [employer], became ill in 2022  Had prolonged illness with COVID, trach, stay at [facility]. Former tobacco smoker, quit in 2022.  Had renal failure needing CRRT, pAfib.  Prolonged 2 mo stay.   NST 10/2022: low risk of myocardial ischemia.  Echo 2022: EF normal, no pHTN     On oxygen, more needed for exertion.  Some days better than others, stationary bike qod, limited by knees now.    Feeling better now, less HUMPHREY, less SOB.  No CP, angina.    No new palp.   Patient denies recent history of orthopnea, PND, excessive dizziness and/or syncope.        Father with PPM, PCI, seen by EP.       Past Medical History, Past Surgical History, Family history, Social History, and Medications were all reviewed with the patient today and updated as necessary.     Current Outpatient Medications   Medication Sig Dispense Refill    WIXELA INHUB 100-50 MCG/ACT AEPB diskus inhaler Inhale 1 puff into the lungs 2 times daily      sodium chloride, Inhalant, 3 % nebulizer solution Take 4 mLs by nebulization in the morning and at bedtime 240 mL 11    OXYGEN Inhale into the lungs 2-3 LPM      sertraline (ZOLOFT) 50 MG tablet Take 1 tablet by mouth daily      busPIRone (BUSPAR) 5 MG tablet Take 1 tablet by mouth once      gabapentin (NEURONTIN) 100 MG capsule in the morning and at bedtime.      metoprolol tartrate (LOPRESSOR) 25 MG tablet Take 1 tablet by mouth 2 times daily      clonazePAM (KLONOPIN) 1 MG tablet Take 1 tablet by mouth 2 times daily as needed.      albuterol (PROVENTIL) (2.5 MG/3ML) 0.083% nebulizer solution Take 3 mLs by nebulization every 6 hours as needed for Wheezing

## 2025-02-04 ENCOUNTER — TELEPHONE (OUTPATIENT)
Dept: PULMONOLOGY | Age: 50
End: 2025-02-04

## 2025-06-12 ENCOUNTER — OFFICE VISIT (OUTPATIENT)
Age: 50
End: 2025-06-12
Payer: COMMERCIAL

## 2025-06-12 VITALS
BODY MASS INDEX: 37.11 KG/M2 | DIASTOLIC BLOOD PRESSURE: 80 MMHG | HEIGHT: 73 IN | WEIGHT: 280 LBS | SYSTOLIC BLOOD PRESSURE: 122 MMHG | HEART RATE: 104 BPM

## 2025-06-12 DIAGNOSIS — J96.11 CHRONIC RESPIRATORY FAILURE WITH HYPOXIA (HCC): ICD-10-CM

## 2025-06-12 DIAGNOSIS — I48.0 PAROXYSMAL ATRIAL FIBRILLATION (HCC): Primary | ICD-10-CM

## 2025-06-12 DIAGNOSIS — R06.02 SHORTNESS OF BREATH: ICD-10-CM

## 2025-06-12 PROCEDURE — 99214 OFFICE O/P EST MOD 30 MIN: CPT | Performed by: INTERNAL MEDICINE

## 2025-06-12 NOTE — PROGRESS NOTES
2 [facility], [address] [place] [address]  PHONE: [phone]     25    NAME:  Santos Cordon  : 1975  MRN: [id number]       SUBJECTIVE:   Santos Cordon is a 50 y.o. male seen for a follow up visit regarding the following:     Chief Complaint   Patient presents with    Atrial Fibrillation    Follow-up       HPI:   Was working for [employer], became ill in 2022  Had prolonged illness with COVID, trach, stay at [facility]. Former tobacco smoker, quit in 2022.  Had renal failure needing CRRT, pAfib.  Prolonged 2 mo stay.   NST 10/2022: low risk of myocardial ischemia.  Echo 2022: EF normal, no pHTN     No change in breathing - \"the same\".  With [facility]  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   Some days better than others, stationary bike qod, limited by knees, but doing well now.     No CP, angina.    No new palp.     Patient denies recent history of orthopnea, PND, excessive dizziness and/or syncope.        Father with PPM, PCI, seen by EP.          Past Medical History, Past Surgical History, Family history, Social History, and Medications were all reviewed with the patient today and updated as necessary.     Current Outpatient Medications   Medication Sig Dispense Refill    WIXELA INHUB 100-50 MCG/ACT AEPB diskus inhaler Inhale 1 puff into the lungs 2 times daily      sodium chloride, Inhalant, 3 % nebulizer solution Take 4 mLs by nebulization in the morning and at bedtime 240 mL 11    OXYGEN Inhale into the lungs 2-3 LPM      sertraline (ZOLOFT) 50 MG tablet Take 1 tablet by mouth daily      busPIRone (BUSPAR) 5 MG tablet Take 1 tablet by mouth once      gabapentin (NEURONTIN) 100 MG capsule in the morning and at bedtime.      metoprolol tartrate (LOPRESSOR) 25 MG tablet Take 1 tablet by mouth 2 times daily      clonazePAM (KLONOPIN) 1 MG tablet Take 1 tablet by mouth 2 times daily

## 2025-07-03 ENCOUNTER — TELEPHONE (OUTPATIENT)
Age: 50
End: 2025-07-03

## 2025-07-03 NOTE — TELEPHONE ENCOUNTER
Form received, completed, and signed by Dr Spicer.     Forms faxed to [employer] at [phone]. Fax confirmation received. Copy sent to Medical records to be scanned to the pt's chart.